# Patient Record
Sex: MALE | Race: BLACK OR AFRICAN AMERICAN | Employment: OTHER | URBAN - METROPOLITAN AREA
[De-identification: names, ages, dates, MRNs, and addresses within clinical notes are randomized per-mention and may not be internally consistent; named-entity substitution may affect disease eponyms.]

---

## 2021-06-17 ENCOUNTER — HOSPITAL ENCOUNTER (OUTPATIENT)
Age: 56
Setting detail: OBSERVATION
Discharge: HOME | End: 2021-06-18
Attending: EMERGENCY MEDICINE | Admitting: GENERAL ACUTE CARE HOSPITAL
Payer: OTHER GOVERNMENT

## 2021-06-17 ENCOUNTER — HOSPITAL ENCOUNTER (EMERGENCY)
Age: 56
Discharge: ANOTHER HOSPITAL | End: 2021-06-17
Attending: EMERGENCY MEDICINE

## 2021-06-17 ENCOUNTER — APPOINTMENT (OUTPATIENT)
Dept: CT IMAGING | Age: 56
End: 2021-06-17
Attending: EMERGENCY MEDICINE
Payer: OTHER GOVERNMENT

## 2021-06-17 ENCOUNTER — APPOINTMENT (OUTPATIENT)
Dept: GENERAL RADIOLOGY | Age: 56
End: 2021-06-17
Attending: EMERGENCY MEDICINE

## 2021-06-17 VITALS
OXYGEN SATURATION: 100 % | TEMPERATURE: 98.8 F | WEIGHT: 286 LBS | HEART RATE: 74 BPM | DIASTOLIC BLOOD PRESSURE: 98 MMHG | BODY MASS INDEX: 40.04 KG/M2 | RESPIRATION RATE: 16 BRPM | HEIGHT: 71 IN | SYSTOLIC BLOOD PRESSURE: 178 MMHG

## 2021-06-17 DIAGNOSIS — E66.9 OBESITY WITH SERIOUS COMORBIDITY, UNSPECIFIED CLASSIFICATION, UNSPECIFIED OBESITY TYPE: ICD-10-CM

## 2021-06-17 DIAGNOSIS — R07.9 ACUTE CHEST PAIN: Primary | ICD-10-CM

## 2021-06-17 DIAGNOSIS — R07.9 CHEST PAIN, UNSPECIFIED TYPE: ICD-10-CM

## 2021-06-17 DIAGNOSIS — I10 HTN (HYPERTENSION), MALIGNANT: Primary | ICD-10-CM

## 2021-06-17 DIAGNOSIS — M54.6 ACUTE BILATERAL THORACIC BACK PAIN: ICD-10-CM

## 2021-06-17 PROBLEM — I16.0 HYPERTENSIVE URGENCY: Status: ACTIVE | Noted: 2021-06-17

## 2021-06-17 LAB
ALBUMIN SERPL-MCNC: 3.5 G/DL (ref 3.5–5)
ALBUMIN/GLOB SERPL: 0.8 {RATIO} (ref 1.1–2.2)
ALP SERPL-CCNC: 109 U/L (ref 45–117)
ALT SERPL-CCNC: 48 U/L (ref 12–78)
ANION GAP SERPL CALC-SCNC: 11 MMOL/L (ref 5–15)
AST SERPL-CCNC: 22 U/L (ref 15–37)
ATRIAL RATE: 77 BPM
BASOPHILS # BLD: 0.1 K/UL (ref 0–0.1)
BASOPHILS NFR BLD: 1 % (ref 0–1)
BILIRUB SERPL-MCNC: 0.2 MG/DL (ref 0.2–1)
BUN SERPL-MCNC: 16 MG/DL (ref 6–20)
BUN/CREAT SERPL: 13 (ref 12–20)
CALCIUM SERPL-MCNC: 10.1 MG/DL (ref 8.5–10.1)
CALCULATED P AXIS, ECG09: 37 DEGREES
CALCULATED R AXIS, ECG10: 29 DEGREES
CALCULATED T AXIS, ECG11: 105 DEGREES
CHLORIDE SERPL-SCNC: 103 MMOL/L (ref 97–108)
CO2 SERPL-SCNC: 26 MMOL/L (ref 21–32)
CREAT SERPL-MCNC: 1.27 MG/DL (ref 0.7–1.3)
D DIMER PPP FEU-MCNC: 1.02 MG/L FEU (ref 0–0.65)
DIAGNOSIS, 93000: NORMAL
DIFFERENTIAL METHOD BLD: ABNORMAL
EOSINOPHIL # BLD: 0.5 K/UL (ref 0–0.4)
EOSINOPHIL NFR BLD: 7 % (ref 0–7)
ERYTHROCYTE [DISTWIDTH] IN BLOOD BY AUTOMATED COUNT: 13.9 % (ref 11.5–14.5)
GLOBULIN SER CALC-MCNC: 4.2 G/DL (ref 2–4)
GLUCOSE SERPL-MCNC: 228 MG/DL (ref 65–100)
HCT VFR BLD AUTO: 34.8 % (ref 36.6–50.3)
HGB BLD-MCNC: 11 G/DL (ref 12.1–17)
IMM GRANULOCYTES # BLD AUTO: 0 K/UL (ref 0–0.04)
IMM GRANULOCYTES NFR BLD AUTO: 0 % (ref 0–0.5)
INR PPP: 1.1 (ref 0.9–1.1)
LYMPHOCYTES # BLD: 2.6 K/UL (ref 0.8–3.5)
LYMPHOCYTES NFR BLD: 34 % (ref 12–49)
MCH RBC QN AUTO: 26.6 PG (ref 26–34)
MCHC RBC AUTO-ENTMCNC: 31.6 G/DL (ref 30–36.5)
MCV RBC AUTO: 84.1 FL (ref 80–99)
MONOCYTES # BLD: 0.6 K/UL (ref 0–1)
MONOCYTES NFR BLD: 7 % (ref 5–13)
NEUTS SEG # BLD: 3.9 K/UL (ref 1.8–8)
NEUTS SEG NFR BLD: 51 % (ref 32–75)
NRBC # BLD: 0 K/UL (ref 0–0.01)
NRBC BLD-RTO: 0 PER 100 WBC
P-R INTERVAL, ECG05: 198 MS
PLATELET # BLD AUTO: 302 K/UL (ref 150–400)
PMV BLD AUTO: 11.3 FL (ref 8.9–12.9)
POTASSIUM SERPL-SCNC: 3.6 MMOL/L (ref 3.5–5.1)
PROT SERPL-MCNC: 7.7 G/DL (ref 6.4–8.2)
PROTHROMBIN TIME: 10.4 SEC (ref 9–11.1)
Q-T INTERVAL, ECG07: 392 MS
QRS DURATION, ECG06: 94 MS
QTC CALCULATION (BEZET), ECG08: 443 MS
RBC # BLD AUTO: 4.14 M/UL (ref 4.1–5.7)
SODIUM SERPL-SCNC: 140 MMOL/L (ref 136–145)
TROPONIN I SERPL-MCNC: <0.05 NG/ML
TROPONIN I SERPL-MCNC: <0.05 NG/ML
VENTRICULAR RATE, ECG03: 77 BPM
WBC # BLD AUTO: 7.7 K/UL (ref 4.1–11.1)

## 2021-06-17 PROCEDURE — 74011000636 HC RX REV CODE- 636: Performed by: EMERGENCY MEDICINE

## 2021-06-17 PROCEDURE — 74011250636 HC RX REV CODE- 250/636: Performed by: EMERGENCY MEDICINE

## 2021-06-17 PROCEDURE — 96376 TX/PRO/DX INJ SAME DRUG ADON: CPT

## 2021-06-17 PROCEDURE — 84484 ASSAY OF TROPONIN QUANT: CPT

## 2021-06-17 PROCEDURE — 85025 COMPLETE CBC W/AUTO DIFF WBC: CPT

## 2021-06-17 PROCEDURE — 99285 EMERGENCY DEPT VISIT HI MDM: CPT

## 2021-06-17 PROCEDURE — 74011000250 HC RX REV CODE- 250: Performed by: EMERGENCY MEDICINE

## 2021-06-17 PROCEDURE — C9113 INJ PANTOPRAZOLE SODIUM, VIA: HCPCS | Performed by: EMERGENCY MEDICINE

## 2021-06-17 PROCEDURE — 93005 ELECTROCARDIOGRAM TRACING: CPT

## 2021-06-17 PROCEDURE — 36415 COLL VENOUS BLD VENIPUNCTURE: CPT

## 2021-06-17 PROCEDURE — 85379 FIBRIN DEGRADATION QUANT: CPT

## 2021-06-17 PROCEDURE — 71045 X-RAY EXAM CHEST 1 VIEW: CPT

## 2021-06-17 PROCEDURE — 65660000000 HC RM CCU STEPDOWN

## 2021-06-17 PROCEDURE — 74011250637 HC RX REV CODE- 250/637: Performed by: EMERGENCY MEDICINE

## 2021-06-17 PROCEDURE — 96375 TX/PRO/DX INJ NEW DRUG ADDON: CPT

## 2021-06-17 PROCEDURE — 99218 HC RM OBSERVATION: CPT

## 2021-06-17 PROCEDURE — 94762 N-INVAS EAR/PLS OXIMTRY CONT: CPT

## 2021-06-17 PROCEDURE — 65270000029 HC RM PRIVATE

## 2021-06-17 PROCEDURE — 96374 THER/PROPH/DIAG INJ IV PUSH: CPT

## 2021-06-17 PROCEDURE — 71275 CT ANGIOGRAPHY CHEST: CPT

## 2021-06-17 PROCEDURE — 85610 PROTHROMBIN TIME: CPT

## 2021-06-17 PROCEDURE — 80053 COMPREHEN METABOLIC PANEL: CPT

## 2021-06-17 RX ORDER — MORPHINE SULFATE 4 MG/ML
4 INJECTION INTRAVENOUS
Status: DISCONTINUED | OUTPATIENT
Start: 2021-06-17 | End: 2021-06-17 | Stop reason: HOSPADM

## 2021-06-17 RX ORDER — KETOROLAC TROMETHAMINE 30 MG/ML
15 INJECTION, SOLUTION INTRAMUSCULAR; INTRAVENOUS
Status: COMPLETED | OUTPATIENT
Start: 2021-06-17 | End: 2021-06-17

## 2021-06-17 RX ORDER — SODIUM CHLORIDE 0.9 % (FLUSH) 0.9 %
5-10 SYRINGE (ML) INJECTION ONCE
Status: DISCONTINUED | OUTPATIENT
Start: 2021-06-17 | End: 2021-06-17 | Stop reason: HOSPADM

## 2021-06-17 RX ORDER — NITROGLYCERIN 0.4 MG/1
0.4 TABLET SUBLINGUAL AS NEEDED
Status: DISCONTINUED | OUTPATIENT
Start: 2021-06-17 | End: 2021-06-18

## 2021-06-17 RX ORDER — LABETALOL HYDROCHLORIDE 5 MG/ML
20 INJECTION, SOLUTION INTRAVENOUS
Status: COMPLETED | OUTPATIENT
Start: 2021-06-17 | End: 2021-06-17

## 2021-06-17 RX ORDER — ASPIRIN 325 MG
325 TABLET ORAL ONCE
Status: COMPLETED | OUTPATIENT
Start: 2021-06-17 | End: 2021-06-17

## 2021-06-17 RX ADMIN — KETOROLAC TROMETHAMINE 15 MG: 30 INJECTION, SOLUTION INTRAMUSCULAR at 16:16

## 2021-06-17 RX ADMIN — ALUMINUM HYDROXIDE AND MAGNESIUM HYDROXIDE 40 ML: 200; 200 SUSPENSION ORAL at 20:37

## 2021-06-17 RX ADMIN — ASPIRIN 325 MG ORAL TABLET 325 MG: 325 PILL ORAL at 14:13

## 2021-06-17 RX ADMIN — IOPAMIDOL 100 ML: 755 INJECTION, SOLUTION INTRAVENOUS at 20:58

## 2021-06-17 RX ADMIN — LABETALOL HYDROCHLORIDE 20 MG: 5 INJECTION INTRAVENOUS at 19:48

## 2021-06-17 RX ADMIN — NITROGLYCERIN 1 INCH: 20 OINTMENT TOPICAL at 15:06

## 2021-06-17 RX ADMIN — NITROGLYCERIN 0.4 MG: 0.4 TABLET, ORALLY DISINTEGRATING SUBLINGUAL at 19:48

## 2021-06-17 RX ADMIN — SODIUM CHLORIDE 40 MG: 9 INJECTION, SOLUTION INTRAMUSCULAR; INTRAVENOUS; SUBCUTANEOUS at 20:37

## 2021-06-17 RX ADMIN — KETOROLAC TROMETHAMINE 15 MG: 30 INJECTION, SOLUTION INTRAMUSCULAR at 14:15

## 2021-06-17 NOTE — ED NOTES
TRANSFER - OUT REPORT:    Verbal report given to [facility] for [facility] (name) on Kalyan Srinivasan  being transferred to ER [facility](unit) for routine progression of care       Report consisted of patients Situation, Background, Assessment and   Recommendations(SBAR). Information from the following report(s) SBAR, Kardex, ED Summary, MAR, Recent Results, Med Rec Status and Cardiac Rhythm NSR was reviewed with the receiving nurse. Lines:   Peripheral IV 06/17/21 Left Antecubital (Active)   Site Assessment Clean, dry, & intact 06/17/21 1406   Phlebitis Assessment 0 06/17/21 1406   Infiltration Assessment 0 06/17/21 1406   Dressing Status Clean, dry, & intact 06/17/21 1406   Dressing Type Transparent 06/17/21 1406   Alcohol Cap Used No 06/17/21 1406        Opportunity for questions and clarification was provided.       Patient transported with:   monitor

## 2021-06-17 NOTE — ED PROVIDER NOTES
EMERGENCY DEPARTMENT HISTORY AND PHYSICAL EXAM      Date: 6/17/2021  Patient Name: Jo Ann Canada    History of Presenting Illness     Chief Complaint   Patient presents with    Chest Pain       History Provided By: Patient    HPI:   The history is provided by the patient. Chest Pain (Angina)   This is a new problem. The current episode started yesterday. The problem has not changed since onset. The problem occurs constantly. The pain is associated with normal activity, movement and rest. The pain is present in the lateral region and left side. The pain is moderate. The quality of the pain is described as dull. The pain radiates to the upper back and left arm. The symptoms are aggravated by movement. Associated symptoms include back pain. Pertinent negatives include no abdominal pain, no cough, no diaphoresis, no dizziness, no exertional chest pressure, no fever, no headaches, no irregular heartbeat, no leg pain, no lower extremity edema, no malaise/fatigue, no nausea, no near-syncope, no palpitations, no shortness of breath, no vomiting and no weakness. He has tried nothing for the symptoms. The treatment provided no relief. Risk factors include diabetes mellitus, hypertension, family history, male gender and dyslipidemia. His past medical history is significant for DM and HTN. His past medical history does not include PE. Pertinent negatives include no cardiac catheterization and no stress echo. Jo Ann Canada, 48 y.o. male  presents to the ED with cc of chest and back pain. Patient reports his back pain started 4 days ago, reports pain is located across the upper back dull achy worse with movement. The back pain moved around to the left side of the chest yesterday evening. He denies any shortness of breath diaphoresis, denies any nausea vomiting. Pain is dull to achy. Last night he reports the pain also started radiating down the left arm.   He denies exertional chest pain shortness of breath prior to these episodes of pain starting. Denies any fever chills cough. He reports family history of heart disease in the mother and father in her 62s and he also has a history of diabetes high blood pressure high cholesterol. He denies any history of coronary disease, reports no prior cardiac testing. Patient does take a baby aspirin daily. There are no other complaints, changes, or physical findings at this time. PCP: Kat Raymond, Not On File, MD    No current facility-administered medications on file prior to encounter. Current Outpatient Medications on File Prior to Encounter   Medication Sig Dispense Refill    oxyCODONE-acetaminophen (PERCOCET) 5-325 mg per tablet Take 1 Tab by mouth every four (4) hours as needed for Pain. Max Daily Amount: 6 Tabs. 6 Tab 0    amLODIPine (NORVASC) 10 mg tablet Take  by mouth daily.  aspirin 81 mg chewable tablet Take 81 mg by mouth daily.  atorvastatin (LIPITOR) 80 mg tablet Take 80 mg by mouth daily.  docusate sodium (COLACE) 100 mg capsule Take 100 mg by mouth two (2) times a day.  hydroCHLOROthiazide (HYDRODIURIL) 25 mg tablet Take 25 mg by mouth daily.  lisinopril (PRINIVIL, ZESTRIL) 40 mg tablet Take 40 mg by mouth daily.  metoprolol tartrate (LOPRESSOR) 50 mg tablet Take 50 mg by mouth two (2) times a day.  minocycline (MINOCIN, DYNACIN) 100 mg capsule Take 100 mg by mouth.  omeprazole (PRILOSEC) 20 mg capsule Take 20 mg by mouth daily.  prazosin (MINIPRESS) 2 mg capsule Take 2 mg by mouth daily.  sennosides 8.6 mg cap Take 1 Tab by mouth.  venlafaxine (EFFEXOR) 75 mg tablet Take 75 mg by mouth three (3) times daily.  [DISCONTINUED] metFORMIN (GLUCOPHAGE) 1,000 mg tablet Take 1,000 mg by mouth two (2) times a day. Past History     Past Medical History:  Past Medical History:   Diagnosis Date    Diabetes ([address]     Hyperlipidemia     Hypertension        Past Surgical History:  History reviewed.  No pertinent surgical history. Family History:  History reviewed. No pertinent family history. Social History:  Social History     Tobacco Use    Smoking status: Never Smoker    Smokeless tobacco: Never Used   Substance Use Topics    Alcohol use: No    Drug use: Never       Allergies: Allergies   Allergen Reactions    Iodine Hives         Review of Systems   Review of Systems   Constitutional: Negative. Negative for chills, diaphoresis, fever and malaise/fatigue. HENT: Negative. Negative for congestion and sore throat. Eyes: Negative. Negative for discharge and redness. Respiratory: Negative. Negative for cough and shortness of breath. Cardiovascular: Positive for chest pain. Negative for palpitations and near-syncope. Gastrointestinal: Negative. Negative for abdominal pain, nausea and vomiting. Endocrine: Negative. Negative for polydipsia and polyuria. Genitourinary: Negative. Negative for dysuria, flank pain and frequency. Musculoskeletal: Positive for back pain. Negative for arthralgias and neck pain. Skin: Negative. Negative for rash and wound. Allergic/Immunologic: Negative. Neurological: Negative. Negative for dizziness, weakness and headaches. Hematological: Negative. Negative for adenopathy. Does not bruise/bleed easily. Psychiatric/Behavioral: Negative. Negative for confusion. The patient is not nervous/anxious. All other systems reviewed and are negative. Physical Exam   Physical Exam  Vitals and nursing note reviewed. Constitutional:       General: He is not in acute distress. Appearance: Normal appearance. He is well-developed. He is not ill-appearing, toxic-appearing or diaphoretic. HENT:      Head: Normocephalic and atraumatic. Nose: Nose normal.      Mouth/Throat:      Mouth: Mucous membranes are moist.      Pharynx: Oropharynx is clear. Eyes:      Extraocular Movements: Extraocular movements intact.       Conjunctiva/sclera: Conjunctivae normal.      Pupils: Pupils are equal, round, and reactive to light. Cardiovascular:      Rate and Rhythm: Normal rate and regular rhythm. Pulses: Normal pulses. Heart sounds: Normal heart sounds. No murmur heard. Pulmonary:      Effort: Pulmonary effort is normal. No respiratory distress. Breath sounds: Normal breath sounds. No wheezing, rhonchi or rales. Abdominal:      General: There is no distension. Palpations: Abdomen is soft. Tenderness: There is no abdominal tenderness. Musculoskeletal:         General: No swelling or tenderness. Normal range of motion. Cervical back: Normal range of motion and neck supple. No rigidity. No muscular tenderness. Right lower leg: Edema present. Left lower leg: Edema present. Comments: trace bilateral lower extremity edema   Skin:     General: Skin is warm and dry. Capillary Refill: Capillary refill takes less than 2 seconds. Findings: No erythema or rash. Neurological:      General: No focal deficit present. Mental Status: He is alert and oriented to person, place, and time. Mental status is at baseline. Cranial Nerves: No cranial nerve deficit. Sensory: No sensory deficit. Motor: No weakness. Psychiatric:         Mood and Affect: Mood normal.         Behavior: Behavior normal.         Thought Content:  Thought content normal.         Judgment: Judgment normal.         Diagnostic Study Results     Labs -     Recent Results (from the past 12 hour(s))   EKG, 12 LEAD, INITIAL    Collection Time: 06/17/21  1:49 PM   Result Value Ref Range    Ventricular Rate 77 BPM    Atrial Rate 77 BPM    P-R Interval 198 ms    QRS Duration 94 ms    Q-T Interval 392 ms    QTC Calculation (Bezet) 443 ms    Calculated P Axis 37 degrees    Calculated R Axis 29 degrees    Calculated T Axis 105 degrees    Diagnosis       Normal sinus rhythm  Nonspecific T wave abnormality  Abnormal ECG  No previous ECGs available CBC WITH AUTOMATED DIFF    Collection Time: 06/17/21  2:00 PM   Result Value Ref Range    WBC 7.7 4.1 - 11.1 K/uL    RBC 4.14 4.10 - 5.70 M/uL    HGB 11.0 (L) 12.1 - 17.0 g/dL    HCT 34.8 (L) 36.6 - 50.3 %    MCV 84.1 80.0 - 99.0 FL    MCH 26.6 26.0 - 34.0 PG    MCHC 31.6 30.0 - 36.5 g/dL    RDW 13.9 11.5 - 14.5 %    PLATELET 409 104 - 209 K/uL    MPV 11.3 8.9 - 12.9 FL    NRBC 0.0 0  WBC    ABSOLUTE NRBC 0.00 0.00 - 0.01 K/uL    NEUTROPHILS 51 32 - 75 %    LYMPHOCYTES 34 12 - 49 %    MONOCYTES 7 5 - 13 %    EOSINOPHILS 7 0 - 7 %    BASOPHILS 1 0 - 1 %    IMMATURE GRANULOCYTES 0 0.0 - 0.5 %    ABS. NEUTROPHILS 3.9 1.8 - 8.0 K/UL    ABS. LYMPHOCYTES 2.6 0.8 - 3.5 K/UL    ABS. MONOCYTES 0.6 0.0 - 1.0 K/UL    ABS. EOSINOPHILS 0.5 (H) 0.0 - 0.4 K/UL    ABS. BASOPHILS 0.1 0.0 - 0.1 K/UL    ABS. IMM. GRANS. 0.0 0.00 - 0.04 K/UL    DF AUTOMATED     METABOLIC PANEL, COMPREHENSIVE    Collection Time: 06/17/21  2:00 PM   Result Value Ref Range    Sodium 140 136 - 145 mmol/L    Potassium 3.6 3.5 - 5.1 mmol/L    Chloride 103 97 - 108 mmol/L    CO2 26 21 - 32 mmol/L    Anion gap 11 5 - 15 mmol/L    Glucose 228 (H) 65 - 100 mg/dL    BUN 16 6 - 20 MG/DL    Creatinine 1.27 0.70 - 1.30 MG/DL    BUN/Creatinine ratio 13 12 - 20      GFR est AA >60 >60 ml/min/1.73m2    GFR est non-AA 59 (L) >60 ml/min/1.73m2    Calcium 10.1 8.5 - 10.1 MG/DL    Bilirubin, total 0.2 0.2 - 1.0 MG/DL    ALT (SGPT) 48 12 - 78 U/L    AST (SGOT) 22 15 - 37 U/L    Alk.  phosphatase 109 45 - 117 U/L    Protein, total 7.7 6.4 - 8.2 g/dL    Albumin 3.5 3.5 - 5.0 g/dL    Globulin 4.2 (H) 2.0 - 4.0 g/dL    A-G Ratio 0.8 (L) 1.1 - 2.2     TROPONIN I    Collection Time: 06/17/21  2:00 PM   Result Value Ref Range    Troponin-I, Qt. <0.05 <0.05 ng/mL   PROTHROMBIN TIME + INR    Collection Time: 06/17/21  2:00 PM   Result Value Ref Range    INR 1.1 0.9 - 1.1      Prothrombin time 10.4 9.0 - 11.1 sec   D DIMER    Collection Time: 06/17/21  2:00 PM   Result Value Ref Range    D-dimer 1.02 (H) 0.00 - 0.65 mg/L FEU       Radiologic Studies -   XR CHEST PORT   Final Result      No acute process on portable chest.           CT Results  (Last 48 hours)    None        CXR Results  (Last 48 hours)               06/17/21 1356  XR CHEST PORT Final result    Impression:      No acute process on portable chest.           Narrative:  EXAM:  XR CHEST PORT       INDICATION: Chest pain for 4 days with associated fatigue, intermittent   extension into the left upper extremity. Chronic pain medications. COMPARISON: none       TECHNIQUE: Upright portable chest AP view       FINDINGS: Cardiac monitoring wires overlie the thorax. The cardiomediastinal and   hilar contours are within normal limits. The pulmonary vasculature is within   normal limits. The lungs and pleural spaces are clear. The visualized bones and upper abdomen   are age-appropriate. Medical Decision Making   I am the first provider for this patient. I reviewed the vital signs, available nursing notes, past medical history, past surgical history, family history and social history. Vital Signs-Reviewed the patient's vital signs. Patient Vitals for the past 12 hrs:   Temp Pulse Resp BP SpO2   06/17/21 1413  78 16 134/89 99 %   06/17/21 1404 99.3 °F (37.4 °C) 89 20 (!) 158/96 98 %           EKG interpretation: (Preliminary)  Rhythm: normal sinus rhythm;  regular . Rate (approx.): 77; Blocks: none; Ectopy: noneAxis: normal; P wave: normal; QRS interval: normal ; ST/T wave: non-specific changes; in  Lead: ; Other findings: .        Records Reviewed: Nursing Notes, Old Medical Records and Previous electrocardiograms    Provider Notes (Medical Decision Making):   Patient presents complaining of back and chest pain, will check EKG laboratory studies chest x-ray D-dimer. Patient has multiple risk factors for coronary disease. ED Course:   Initial assessment performed.  The patients presenting problems have been discussed, and they are in agreement with the care plan formulated and outlined with them. I have encouraged them to ask questions as they arise throughout their visit. ED Course as of Jun 17 1507   Thu Jun 17, 2021   1452 Updated patient and family laboratory studies and plan for admission. Patient requesting to go to the [facility]. Patient does report improvement with pain meds    [MF]   1459 Patient agreeable to be transferred to [facility] since [place] is not excepting patients    [MF]   1502 Transfer center contacted regarding transfer ED [facility], no inpt beds will transfer to ED    [MF]   1503 Dr Leonora Chavez accepts to ED ED [facility]    []      ED Course User Index  [MF] Ady Bond MD     Patient's heart score is 5      3:01 PM    Patient presents complaining of chest and back pain, symptoms are prolonged and has atypical component. EKG has some nonspecific changes he has multiple risk factors for coronary disease with no coronary history. First troponin was negative, recommend admission for further evaluation serial enzymes and cardiac consultation. Patient is agreeable to be admitted to [facility].  Patient's D-dimer was elevated however patient is allergic to iodine unable to perform CTA here. I have reviewed all pertinent and currently available diagnostic test results for this visit including, but not limited to, labs, xrays, and EKGs. I have reviewed all pertinent and currently available medical records. My plan of care and further evaluation and/or disposition is based on these results, as well as the initial, and subsequent, history and physical exam, as well as any additional complaints during the visit.     Ty Hernandez MD        Procedures      Critical Care Time:   CRITICAL CARE NOTE :    3:01 PM      IMPENDING DETERIORATION -Cardiovascular  ASSOCIATED RISK FACTORS - Dysrhythmia  MANAGEMENT- Bedside Assessment, Supervision of Care and Transfer  INTERPRETATION -  Xrays, ECG and Blood Pressure  INTERVENTIONS - Metobolic interventions  CASE REVIEW - Hospitalist/Intensivist and Family  TREATMENT RESPONSE -Improved and Stable  PERFORMED BY - Self    NOTES   :  I have spent 32 minutes of critical care time involved in lab review, consultations with specialist, family decision- making, bedside attention and documentation. This time excludes time spent in any separate billed procedures. During this entire length of time I was immediately available to the patient . Masoud Reddy MD      Disposition:    Transferred to Another Facility        Diagnosis     Clinical Impression:   1. Acute chest pain    2. Acute bilateral thoracic back pain        Attestations: Masoud Reddy MD    Please note that this dictation was completed with [employer], the computer voice recognition software. Quite often unanticipated grammatical, syntax, homophones, and other interpretive errors are inadvertently transcribed by the computer software. Please disregard these errors. Please excuse any errors that have escaped final proofreading. Thank you.

## 2021-06-17 NOTE — Clinical Note
Status[de-identified] INPATIENT [101]   Type of Bed: Stepdown [17]   Cardiac Monitoring Required?: Yes   Inpatient Hospitalization Certified Necessary for the Following Reasons: 3.  Patient receiving treatment that can only be provided in an inpatient setting (further clarification in H&P documentation)   Admitting Diagnosis: Hypertensive urgency [[id number]]   Admitting Physician: Eduard Garcias [[id number]]   Attending Physician: Darci Camarillo   Estimated Length of Stay: 2 Midnights   Discharge Plan[de-identified] Home with Office Follow-up

## 2021-06-17 NOTE — ED TRIAGE NOTES
Assumed care of pt from triage. Pt is A&O x 4. Pt is a transfer from [facility]. Pt states that Sunday he began to have lower back pain that radiated down his left leg. Pt says yesterday the pain went from his back to his chest. Pt rates CP at this time 6 out of 10. Pt at [facility] was given 352 of ASA, 1 inch of nitro, and 15 mg of Toradol. Pt troponin was negative but d-dimer was elevated. Pt resting on stretcher in POC with call bell in reach. Pt placed on monitor x 3. VSS at this time. 1937: Dr. Wilder Segal at bedside evaluating pt.     1955: Medicated pt per orders. 2043: Medicated pt per orders. Pt off the floor to CT.     2134: Pt provided with food and drink at this time. 2309: Pt notified this RN that he normally sleeps with a CPAP machine at night. Pt placed on 2 L NC at this time so pt can sleep. Pt resting on stretcher in POC with call bell in reach. Pt remains on monitor x 3. VSS at this time. 2329: Bedside and Verbal shift change report given to [facility] (oncoming nurse) by Hernandez Serrato (offgoing nurse). Report included the following information SBAR, ED Summary and Recent Results.

## 2021-06-17 NOTE — ED TRIAGE NOTES
Pain started in his back Sunday. Sharp pain. Moved to his chest Wednesday. Pain never went away. Patient takes oxycodone for chronic pain. NO nausea or vomiting or SOB or feeling sweaty. More fatigued. At night the pain goes into his left arm.

## 2021-06-17 NOTE — ED NOTES
Called [facility] and spoke with Lian to arrange an ACLS transport. Went over diagnosis and equipment needed (IV, CM.)  ETA of 1600 given.

## 2021-06-17 NOTE — ED NOTES
Unable to get through to the charge phone number line not connecting. Called Transfer center to connect.

## 2021-06-17 NOTE — ED NOTES
TRANSFER - OUT REPORT:    Verbal report given to [address] [place] [address] at [facility](name) on Daryle Rhea  being transferred to [facility](unit) for routine progression of care       Report consisted of patients Situation, Background, Assessment and   Recommendations(SBAR). Information from the following report(s) SBAR, Kardex, ED Summary, Intake/Output, MAR, Recent Results, Med Rec Status and Cardiac Rhythm NSR was reviewed with the receiving nurse. Lines:   Peripheral IV 06/17/21 Left Antecubital (Active)   Site Assessment Clean, dry, & intact 06/17/21 1406   Phlebitis Assessment 0 06/17/21 1406   Infiltration Assessment 0 06/17/21 1406   Dressing Status Clean, dry, & intact 06/17/21 1406   Dressing Type Transparent 06/17/21 1406   Alcohol Cap Used No 06/17/21 1406        Opportunity for questions and clarification was provided.       Patient transported with:   Awaiting transport

## 2021-06-18 ENCOUNTER — APPOINTMENT (OUTPATIENT)
Dept: CT IMAGING | Age: 56
End: 2021-06-18
Attending: GENERAL ACUTE CARE HOSPITAL
Payer: OTHER GOVERNMENT

## 2021-06-18 VITALS
HEIGHT: 71 IN | BODY MASS INDEX: 40.04 KG/M2 | DIASTOLIC BLOOD PRESSURE: 86 MMHG | SYSTOLIC BLOOD PRESSURE: 140 MMHG | TEMPERATURE: 98 F | RESPIRATION RATE: 18 BRPM | HEART RATE: 73 BPM | WEIGHT: 286 LBS | OXYGEN SATURATION: 98 %

## 2021-06-18 PROBLEM — M54.9 BACK PAIN: Status: ACTIVE | Noted: 2021-06-18

## 2021-06-18 LAB
ANION GAP SERPL CALC-SCNC: 5 MMOL/L (ref 5–15)
ATRIAL RATE: 74 BPM
BUN SERPL-MCNC: 18 MG/DL (ref 6–20)
BUN/CREAT SERPL: 14 (ref 12–20)
CALCIUM SERPL-MCNC: 9.4 MG/DL (ref 8.5–10.1)
CALCULATED P AXIS, ECG09: 34 DEGREES
CALCULATED R AXIS, ECG10: 25 DEGREES
CALCULATED T AXIS, ECG11: 130 DEGREES
CHLORIDE SERPL-SCNC: 108 MMOL/L (ref 97–108)
CO2 SERPL-SCNC: 25 MMOL/L (ref 21–32)
CREAT SERPL-MCNC: 1.25 MG/DL (ref 0.7–1.3)
DIAGNOSIS, 93000: NORMAL
GLUCOSE BLD STRIP.AUTO-MCNC: 225 MG/DL (ref 65–117)
GLUCOSE BLD STRIP.AUTO-MCNC: 258 MG/DL (ref 65–117)
GLUCOSE SERPL-MCNC: 246 MG/DL (ref 65–100)
P-R INTERVAL, ECG05: 200 MS
POTASSIUM SERPL-SCNC: 3.7 MMOL/L (ref 3.5–5.1)
Q-T INTERVAL, ECG07: 370 MS
QRS DURATION, ECG06: 84 MS
QTC CALCULATION (BEZET), ECG08: 410 MS
SERVICE CMNT-IMP: ABNORMAL
SERVICE CMNT-IMP: ABNORMAL
SODIUM SERPL-SCNC: 138 MMOL/L (ref 136–145)
TROPONIN I SERPL-MCNC: <0.05 NG/ML
VENTRICULAR RATE, ECG03: 74 BPM

## 2021-06-18 PROCEDURE — 36415 COLL VENOUS BLD VENIPUNCTURE: CPT

## 2021-06-18 PROCEDURE — 74011250637 HC RX REV CODE- 250/637: Performed by: GENERAL ACUTE CARE HOSPITAL

## 2021-06-18 PROCEDURE — 80048 BASIC METABOLIC PNL TOTAL CA: CPT

## 2021-06-18 PROCEDURE — 74011000250 HC RX REV CODE- 250: Performed by: GENERAL ACUTE CARE HOSPITAL

## 2021-06-18 PROCEDURE — 74011250636 HC RX REV CODE- 250/636: Performed by: GENERAL ACUTE CARE HOSPITAL

## 2021-06-18 PROCEDURE — 72128 CT CHEST SPINE W/O DYE: CPT

## 2021-06-18 PROCEDURE — 99218 HC RM OBSERVATION: CPT

## 2021-06-18 PROCEDURE — 74011636637 HC RX REV CODE- 636/637: Performed by: NURSE PRACTITIONER

## 2021-06-18 PROCEDURE — 96372 THER/PROPH/DIAG INJ SC/IM: CPT

## 2021-06-18 PROCEDURE — 82962 GLUCOSE BLOOD TEST: CPT

## 2021-06-18 PROCEDURE — 84484 ASSAY OF TROPONIN QUANT: CPT

## 2021-06-18 RX ORDER — HYDROMORPHONE HYDROCHLORIDE 1 MG/ML
1 INJECTION, SOLUTION INTRAMUSCULAR; INTRAVENOUS; SUBCUTANEOUS
Status: DISCONTINUED | OUTPATIENT
Start: 2021-06-18 | End: 2021-06-18 | Stop reason: HOSPADM

## 2021-06-18 RX ORDER — LIDOCAINE 4 G/100G
1 PATCH TOPICAL EVERY 24 HOURS
Status: DISCONTINUED | OUTPATIENT
Start: 2021-06-18 | End: 2021-06-18 | Stop reason: HOSPADM

## 2021-06-18 RX ORDER — ATORVASTATIN CALCIUM 40 MG/1
80 TABLET, FILM COATED ORAL DAILY
Status: DISCONTINUED | OUTPATIENT
Start: 2021-06-18 | End: 2021-06-18 | Stop reason: HOSPADM

## 2021-06-18 RX ORDER — PANTOPRAZOLE SODIUM 40 MG/1
40 TABLET, DELAYED RELEASE ORAL
Status: DISCONTINUED | OUTPATIENT
Start: 2021-06-18 | End: 2021-06-18 | Stop reason: HOSPADM

## 2021-06-18 RX ORDER — DEXTROSE 50 % IN WATER (D50W) INTRAVENOUS SYRINGE
12.5-25 AS NEEDED
Status: DISCONTINUED | OUTPATIENT
Start: 2021-06-18 | End: 2021-06-18 | Stop reason: HOSPADM

## 2021-06-18 RX ORDER — ACETAMINOPHEN 650 MG/1
650 SUPPOSITORY RECTAL
Status: DISCONTINUED | OUTPATIENT
Start: 2021-06-18 | End: 2021-06-18 | Stop reason: HOSPADM

## 2021-06-18 RX ORDER — HYDROCHLOROTHIAZIDE 25 MG/1
25 TABLET ORAL DAILY
Status: DISCONTINUED | OUTPATIENT
Start: 2021-06-18 | End: 2021-06-18 | Stop reason: HOSPADM

## 2021-06-18 RX ORDER — SODIUM CHLORIDE 0.9 % (FLUSH) 0.9 %
5-40 SYRINGE (ML) INJECTION AS NEEDED
Status: DISCONTINUED | OUTPATIENT
Start: 2021-06-18 | End: 2021-06-18 | Stop reason: HOSPADM

## 2021-06-18 RX ORDER — LISINOPRIL 20 MG/1
40 TABLET ORAL DAILY
Status: DISCONTINUED | OUTPATIENT
Start: 2021-06-18 | End: 2021-06-18 | Stop reason: HOSPADM

## 2021-06-18 RX ORDER — ENOXAPARIN SODIUM 100 MG/ML
40 INJECTION SUBCUTANEOUS DAILY
Status: DISCONTINUED | OUTPATIENT
Start: 2021-06-18 | End: 2021-06-18 | Stop reason: HOSPADM

## 2021-06-18 RX ORDER — MAGNESIUM SULFATE 100 %
4 CRYSTALS MISCELLANEOUS AS NEEDED
Status: DISCONTINUED | OUTPATIENT
Start: 2021-06-18 | End: 2021-06-18 | Stop reason: HOSPADM

## 2021-06-18 RX ORDER — OXYCODONE AND ACETAMINOPHEN 5; 325 MG/1; MG/1
2 TABLET ORAL
Status: DISCONTINUED | OUTPATIENT
Start: 2021-06-18 | End: 2021-06-18 | Stop reason: HOSPADM

## 2021-06-18 RX ORDER — SODIUM CHLORIDE 9 MG/ML
50 INJECTION, SOLUTION INTRAVENOUS CONTINUOUS
Status: DISCONTINUED | OUTPATIENT
Start: 2021-06-18 | End: 2021-06-18 | Stop reason: HOSPADM

## 2021-06-18 RX ORDER — ONDANSETRON 2 MG/ML
4 INJECTION INTRAMUSCULAR; INTRAVENOUS
Status: DISCONTINUED | OUTPATIENT
Start: 2021-06-18 | End: 2021-06-18 | Stop reason: HOSPADM

## 2021-06-18 RX ORDER — SODIUM CHLORIDE 0.9 % (FLUSH) 0.9 %
5-40 SYRINGE (ML) INJECTION EVERY 8 HOURS
Status: DISCONTINUED | OUTPATIENT
Start: 2021-06-18 | End: 2021-06-18 | Stop reason: HOSPADM

## 2021-06-18 RX ORDER — ACETAMINOPHEN 325 MG/1
650 TABLET ORAL
Status: DISCONTINUED | OUTPATIENT
Start: 2021-06-18 | End: 2021-06-18 | Stop reason: HOSPADM

## 2021-06-18 RX ORDER — KETOROLAC TROMETHAMINE 30 MG/ML
30 INJECTION, SOLUTION INTRAMUSCULAR; INTRAVENOUS
Status: DISCONTINUED | OUTPATIENT
Start: 2021-06-18 | End: 2021-06-18 | Stop reason: HOSPADM

## 2021-06-18 RX ORDER — ONDANSETRON 4 MG/1
4 TABLET, ORALLY DISINTEGRATING ORAL
Status: DISCONTINUED | OUTPATIENT
Start: 2021-06-18 | End: 2021-06-18 | Stop reason: HOSPADM

## 2021-06-18 RX ORDER — SENNOSIDES 8.6 MG/1
2 TABLET ORAL DAILY
Status: DISCONTINUED | OUTPATIENT
Start: 2021-06-18 | End: 2021-06-18 | Stop reason: HOSPADM

## 2021-06-18 RX ORDER — METOPROLOL TARTRATE 50 MG/1
50 TABLET ORAL 2 TIMES DAILY
Status: DISCONTINUED | OUTPATIENT
Start: 2021-06-18 | End: 2021-06-18 | Stop reason: HOSPADM

## 2021-06-18 RX ORDER — POLYETHYLENE GLYCOL 3350 17 G/17G
17 POWDER, FOR SOLUTION ORAL DAILY PRN
Status: DISCONTINUED | OUTPATIENT
Start: 2021-06-18 | End: 2021-06-18 | Stop reason: HOSPADM

## 2021-06-18 RX ORDER — HYDRALAZINE HYDROCHLORIDE 20 MG/ML
20 INJECTION INTRAMUSCULAR; INTRAVENOUS
Status: DISCONTINUED | OUTPATIENT
Start: 2021-06-18 | End: 2021-06-18 | Stop reason: HOSPADM

## 2021-06-18 RX ORDER — INSULIN LISPRO 100 [IU]/ML
INJECTION, SOLUTION INTRAVENOUS; SUBCUTANEOUS
Status: DISCONTINUED | OUTPATIENT
Start: 2021-06-18 | End: 2021-06-18 | Stop reason: HOSPADM

## 2021-06-18 RX ORDER — GUAIFENESIN 100 MG/5ML
81 LIQUID (ML) ORAL DAILY
Status: DISCONTINUED | OUTPATIENT
Start: 2021-06-18 | End: 2021-06-18 | Stop reason: HOSPADM

## 2021-06-18 RX ORDER — VENLAFAXINE 37.5 MG/1
75 TABLET ORAL 3 TIMES DAILY
Status: DISCONTINUED | OUTPATIENT
Start: 2021-06-18 | End: 2021-06-18 | Stop reason: HOSPADM

## 2021-06-18 RX ORDER — HYDROCHLOROTHIAZIDE 25 MG/1
50 TABLET ORAL DAILY
Qty: 30 TABLET | Refills: 1 | Status: SHIPPED | OUTPATIENT
Start: 2021-06-18

## 2021-06-18 RX ORDER — AMLODIPINE BESYLATE 5 MG/1
10 TABLET ORAL DAILY
Status: DISCONTINUED | OUTPATIENT
Start: 2021-06-18 | End: 2021-06-18 | Stop reason: HOSPADM

## 2021-06-18 RX ORDER — PRAZOSIN HYDROCHLORIDE 1 MG/1
2 CAPSULE ORAL DAILY
Status: DISCONTINUED | OUTPATIENT
Start: 2021-06-18 | End: 2021-06-18 | Stop reason: HOSPADM

## 2021-06-18 RX ADMIN — INSULIN LISPRO 5 UNITS: 100 INJECTION, SOLUTION INTRAVENOUS; SUBCUTANEOUS at 11:55

## 2021-06-18 RX ADMIN — OXYCODONE HYDROCHLORIDE AND ACETAMINOPHEN 2 TABLET: 5; 325 TABLET ORAL at 08:18

## 2021-06-18 RX ADMIN — ASPIRIN 81 MG CHEWABLE TABLET 81 MG: 81 TABLET CHEWABLE at 08:19

## 2021-06-18 RX ADMIN — METOPROLOL TARTRATE 50 MG: 50 TABLET, FILM COATED ORAL at 08:19

## 2021-06-18 RX ADMIN — OXYCODONE HYDROCHLORIDE AND ACETAMINOPHEN 2 TABLET: 5; 325 TABLET ORAL at 03:03

## 2021-06-18 RX ADMIN — PANTOPRAZOLE SODIUM 40 MG: 40 TABLET, DELAYED RELEASE ORAL at 08:19

## 2021-06-18 RX ADMIN — PRAZOSIN HYDROCHLORIDE 2 MG: 1 CAPSULE ORAL at 08:20

## 2021-06-18 RX ADMIN — INSULIN LISPRO 3 UNITS: 100 INJECTION, SOLUTION INTRAVENOUS; SUBCUTANEOUS at 08:20

## 2021-06-18 RX ADMIN — Medication 10 ML: at 03:07

## 2021-06-18 RX ADMIN — VENLAFAXINE 75 MG: 37.5 TABLET ORAL at 08:20

## 2021-06-18 RX ADMIN — ATORVASTATIN CALCIUM 80 MG: 40 TABLET, FILM COATED ORAL at 08:19

## 2021-06-18 RX ADMIN — SODIUM CHLORIDE 50 ML/HR: 9 INJECTION, SOLUTION INTRAVENOUS at 01:44

## 2021-06-18 RX ADMIN — AMLODIPINE BESYLATE 10 MG: 5 TABLET ORAL at 08:19

## 2021-06-18 RX ADMIN — LISINOPRIL 40 MG: 20 TABLET ORAL at 08:18

## 2021-06-18 RX ADMIN — ENOXAPARIN SODIUM 40 MG: 40 INJECTION SUBCUTANEOUS at 08:20

## 2021-06-18 RX ADMIN — HYDROCHLOROTHIAZIDE 25 MG: 25 TABLET ORAL at 08:19

## 2021-06-18 RX ADMIN — SENNOSIDES 17.2 MG: 8.6 TABLET, FILM COATED ORAL at 08:19

## 2021-06-18 NOTE — PROGRESS NOTES
Transition of Care Plan:    RUR: 9%  Disposition: obs   Follow up appointments: Follow up with the [facility]  DME needed: N/A  Transportation at Discharge: Family to transport home   Nolene Arenas or means to access home:  Family have keys to enter home       IM Medicare letter: obs letter   Caregiver Contact: Guera Sanchez (spouse) [phone]  Discharge Caregiver contacted prior to discharge? Family to be contacted      CM completed room visit with pt, to provide him with observation letter (signed) placed in chart. Pt reported that he has a CPAP machine at home, that he wears nightly. Pt reported that he will have transport home on today, provided by his sister. Pt resides 2 hours away, and pt sister plans to be here by 1PM.  Pt plans to follow up with [facility] for follow up appointment. CM completed the need of the pt at this time.     CHRIS Samuel, [address] [place] [address]

## 2021-06-18 NOTE — ED PROVIDER NOTES
EMERGENCY DEPARTMENT HISTORY AND PHYSICAL EXAM    Please note that this dictation was completed with [employer], the computer voice recognition software. Quite often unanticipated grammatical, syntax, homophones, and other interpretive errors are inadvertently transcribed by the computer software. Please disregard these errors. Please excuse any errors that have escaped final proofreading. Date: 6/17/2021  Patient Name: Dell Robles  Patient Age and Sex: 48 y.o. male    History of Presenting Illness     Chief Complaint   Patient presents with    Transfer Of Care       History Provided By: Patient    HPI: Dell Robles, is a 48 y.o. male whose medical history is noted below presents to the emergency department as transfer from an outside hospital for acute onset of chest pain and low back pain. HPI from transferring provider, Dr. Enrique Wodos, is as follows:    Benita Andrews, 48 y.o. male  presents to the ED with cc of chest and back pain. Patient reports his back pain started 4 days ago, reports pain is located across the upper back dull achy worse with movement. The back pain moved around to the left side of the chest yesterday evening. He denies any shortness of breath diaphoresis, denies any nausea vomiting. Pain is dull to achy. Last night he reports the pain also started radiating down the left arm. He denies exertional chest pain shortness of breath prior to these episodes of pain starting. Denies any fever chills cough. He reports family history of heart disease in the mother and father in her 62s and he also has a history of diabetes high blood pressure high cholesterol. He denies any history of coronary disease, reports no prior cardiac testing. Patient does take a baby aspirin daily. \"    On arrival to our emergency department the patient is significantly hypertensive, continues to have the chest pain described here above and states that it has not changed at all since he was seen at the outside hospital.  Pain is nonpleuritic. He denies having any shortness of breath. Received sublingual nitro, full aspirin prior to transfer. He states that he took all of his blood pressure medications today as prescribed. Pt denies any other alleviating or exacerbating factors. No other associated signs or symptoms. There are no other complaints, changes or physical findings at this time. PCP: None    Past History   All documented elements of the [facility] reviewed and verified by me. -Debby Castañeda MD    Past Medical History:  Past Medical History:   Diagnosis Date    Diabetes ([facility] [address]     Hyperlipidemia     Hypertension        Past Surgical History:  No past surgical history on file. Family History:  History reviewed. No pertinent family history. Social History:  Social History     Tobacco Use    Smoking status: Never Smoker    Smokeless tobacco: Never Used   Substance Use Topics    Alcohol use: No    Drug use: Never       Allergies: Allergies   Allergen Reactions    Iodine Hives    Morphine Other (comments)     hallucinations       Review of Systems   All other systems reviewed and negative    Review of Systems   Constitutional: Negative for appetite change, chills and fever. HENT: Negative. Eyes: Negative. Negative for photophobia and visual disturbance. Respiratory: Negative for cough, shortness of breath and wheezing. Cardiovascular: Positive for chest pain. Negative for palpitations. Gastrointestinal: Negative for abdominal pain, constipation, diarrhea, nausea and vomiting. Endocrine: Negative. Genitourinary: Negative for dysuria, flank pain and hematuria. Musculoskeletal: Positive for back pain. Negative for joint swelling. Skin: Negative. Negative for color change and rash. Neurological: Negative for dizziness, weakness, light-headedness, numbness and headaches. Hematological: Negative for adenopathy. Does not bruise/bleed easily.    All other systems reviewed and are negative. Physical Exam   Reviewed patients vital signs and nursing note    Physical Exam  Vitals and nursing note reviewed. Constitutional:       Appearance: He is obese. HENT:      Head: Atraumatic. Mouth/Throat:      Mouth: Mucous membranes are moist.   Eyes:      General: No scleral icterus. Extraocular Movements: Extraocular movements intact. Conjunctiva/sclera: Conjunctivae normal.      Pupils: Pupils are equal, round, and reactive to light. Cardiovascular:      Rate and Rhythm: Normal rate and regular rhythm. Pulses: Normal pulses. Heart sounds: Normal heart sounds. Pulmonary:      Effort: Pulmonary effort is normal.      Breath sounds: Normal breath sounds. Abdominal:      General: Bowel sounds are normal. There is no distension. Palpations: Abdomen is soft. Tenderness: There is no abdominal tenderness. Musculoskeletal:         General: No signs of injury. Normal range of motion. Cervical back: Normal range of motion and neck supple. Skin:     General: Skin is warm and dry. Capillary Refill: Capillary refill takes less than 2 seconds. Neurological:      General: No focal deficit present. Mental Status: He is alert and oriented to person, place, and time. Psychiatric:         Mood and Affect: Mood normal.         Behavior: Behavior normal.         Diagnostic Study Results     Labs - I have personally reviewed and interpreted all laboratory results.  Terrance Lopez MD, MSc  Recent Results (from the past 24 hour(s))   EKG, 12 LEAD, INITIAL    Collection Time: 06/17/21  1:49 PM   Result Value Ref Range    Ventricular Rate 77 BPM    Atrial Rate 77 BPM    P-R Interval 198 ms    QRS Duration 94 ms    Q-T Interval 392 ms    QTC Calculation (Bezet) 443 ms    Calculated P Axis 37 degrees    Calculated R Axis 29 degrees    Calculated T Axis 105 degrees    Diagnosis       Normal sinus rhythm  Nonspecific T wave abnormality  Abnormal ECG  No previous ECGs available  Confirmed by Lalito Cruz MD, --- ([id number]) on 6/17/2021 3:10:15 PM     CBC WITH AUTOMATED DIFF    Collection Time: 06/17/21  2:00 PM   Result Value Ref Range    WBC 7.7 4.1 - 11.1 K/uL    RBC 4.14 4.10 - 5.70 M/uL    HGB 11.0 (L) 12.1 - 17.0 g/dL    HCT 34.8 (L) 36.6 - 50.3 %    MCV 84.1 80.0 - 99.0 FL    MCH 26.6 26.0 - 34.0 PG    MCHC 31.6 30.0 - 36.5 g/dL    RDW 13.9 11.5 - 14.5 %    PLATELET 723 693 - 033 K/uL    MPV 11.3 8.9 - 12.9 FL    NRBC 0.0 0  WBC    ABSOLUTE NRBC 0.00 0.00 - 0.01 K/uL    NEUTROPHILS 51 32 - 75 %    LYMPHOCYTES 34 12 - 49 %    MONOCYTES 7 5 - 13 %    EOSINOPHILS 7 0 - 7 %    BASOPHILS 1 0 - 1 %    IMMATURE GRANULOCYTES 0 0.0 - 0.5 %    ABS. NEUTROPHILS 3.9 1.8 - 8.0 K/UL    ABS. LYMPHOCYTES 2.6 0.8 - 3.5 K/UL    ABS. MONOCYTES 0.6 0.0 - 1.0 K/UL    ABS. EOSINOPHILS 0.5 (H) 0.0 - 0.4 K/UL    ABS. BASOPHILS 0.1 0.0 - 0.1 K/UL    ABS. IMM. GRANS. 0.0 0.00 - 0.04 K/UL    DF AUTOMATED     METABOLIC PANEL, COMPREHENSIVE    Collection Time: 06/17/21  2:00 PM   Result Value Ref Range    Sodium 140 136 - 145 mmol/L    Potassium 3.6 3.5 - 5.1 mmol/L    Chloride 103 97 - 108 mmol/L    CO2 26 21 - 32 mmol/L    Anion gap 11 5 - 15 mmol/L    Glucose 228 (H) 65 - 100 mg/dL    BUN 16 6 - 20 MG/DL    Creatinine 1.27 0.70 - 1.30 MG/DL    BUN/Creatinine ratio 13 12 - 20      GFR est AA >60 >60 ml/min/1.73m2    GFR est non-AA 59 (L) >60 ml/min/1.73m2    Calcium 10.1 8.5 - 10.1 MG/DL    Bilirubin, total 0.2 0.2 - 1.0 MG/DL    ALT (SGPT) 48 12 - 78 U/L    AST (SGOT) 22 15 - 37 U/L    Alk.  phosphatase 109 45 - 117 U/L    Protein, total 7.7 6.4 - 8.2 g/dL    Albumin 3.5 3.5 - 5.0 g/dL    Globulin 4.2 (H) 2.0 - 4.0 g/dL    A-G Ratio 0.8 (L) 1.1 - 2.2     TROPONIN I    Collection Time: 06/17/21  2:00 PM   Result Value Ref Range    Troponin-I, Qt. <0.05 <0.05 ng/mL   PROTHROMBIN TIME + INR    Collection Time: 06/17/21  2:00 PM   Result Value Ref Range    INR 1.1 0.9 - 1.1      Prothrombin time 10.4 9.0 - 11.1 sec   D DIMER    Collection Time: 06/17/21  2:00 PM   Result Value Ref Range    D-dimer 1.02 (H) 0.00 - 0.65 mg/L FEU   EKG, 12 LEAD, INITIAL    Collection Time: 06/17/21  6:49 PM   Result Value Ref Range    Ventricular Rate 74 BPM    Atrial Rate 74 BPM    P-R Interval 200 ms    QRS Duration 84 ms    Q-T Interval 370 ms    QTC Calculation (Bezet) 410 ms    Calculated P Axis 34 degrees    Calculated R Axis 25 degrees    Calculated T Axis 130 degrees    Diagnosis       Normal sinus rhythm  T wave abnormality, consider lateral ischemia  No previous ECGs available     TROPONIN I    Collection Time: 06/17/21  7:45 PM   Result Value Ref Range    Troponin-I, Qt. <0.05 <0.05 ng/mL       Radiologic Studies - I have personally reviewed and interpreted all imaging studies and agree with radiology interpretation and report. Jr Morales MD, MSc  CTA CHEST W OR W WO CONT   Final Result      1. No evidence of acute aortic syndrome. No evidence of pulmonary embolism. No   evidence of acute process in the chest.               Medical Decision Making   I am the first provider for this patient. Records Reviewed: I reviewed our electronic medical record system for any past medical records that were available that may contribute to the patient's current condition, including their PMH, surgical history, social and family history. Reviewed the nursing notes and vital signs from today's visit. Nursing notes will be reviewed as they become available in realtime while the pt has been in the ED. In addition, I read most recent discharge summaries, if available and reviewed prior ECGs or imaging studies for comparison purposes. Gilson Winters MD Msc    Vital Signs-Reviewed the patient's vital signs.   Patient Vitals for the past 24 hrs:   Temp Pulse Resp BP SpO2   06/17/21 2122  74 22 129/80 97 %   06/17/21 2108  70 25 133/81 99 %   06/17/21 2030  75 19 (!) 180/107 99 %   06/17/21 2000  67 19 (!) 194/109 98 % 06/17/21 1952  74 23 (!) 185/112 96 %   06/17/21 1948  71  (!) 209/108    06/17/21 1930  73 23 (!) 209/108 99 %   06/17/21 1900  82 28 (!) 198/112 99 %   06/17/21 1846     99 %   06/17/21 1840 98.2 °F (36.8 °C) 75 25 (!) 178/102 100 %       ECG interpretation: Normal sinus rhythm with rate 74, normal intervals, no ST elevations, depressions or other changes concerning for acute ischemia. This ECG has been viewed and interpreted by me personally. Luz Quintanilla MD, Msc  - no changes since ECG earlier today at 13:49 - Jayashree Gallo MD      Provider Notes (Medical Decision Making):   Patient with multiple risk factors coronary artery disease, heart score of 5, presents to emergency department with chest pain and back pain. This is all in setting of severe potentially malignant hypertension. He is otherwise hemodynamically stable. Based on my interpretation of his blood work and imaging done prior to transfer, for Trop is negative and he will need a repeat troponin here now, chest x-ray shows no acute findings, rest of labs are without acute abnormalities. DDx: ACS, hypertensive emergency versus malignant hypertension, GERD, aortic dissection, PE    The patient usually gets his care at the [place]. That hospital was on diversion today which is why he was transferred here to us. We do not have any prior hospital records for him. Plan and management[de-identified] Labetalol 20 mg IV now, 2 sublingual nitroglycerin, 1 inch Nitropaste. Nicardipine ggt. -Repeat troponin and ECG  -CTA chest  Of note, patient is noted to have an iodine allergy which is why he did not get a CT of his chest prior to transfer. I spoken with the patient about this iodine allergy and it turns out that he is actually never had an allergic reaction to any contrast administration or anything else ever done to him in the hospital.  The only allergy he is ever had was to shellfish.   Given the low likelihood of having a contrast allergy in setting of shellfish, no indication for pre-treatment. We will get CTA immediately. ED Course:   Initial assessment performed. The patients presenting problems have been discussed, and they are in agreement with the care plan formulated and outlined with them. I have encouraged them to ask questions as they arise throughout their visit. 9:45 PM  Based on my review and interpretation of the patient's laboratory results as well as CTA of chest, notable findings are secondary to troponin which is reassuring but will require additional trending for full ACS rule out. In addition, the patient's chest pain now has improved and is down to 4/10 but not resolved. This is concerning. I agree with transferring providers plan to admit on telemetry with cardiology consultation in the morning particularly if the chest pain persists. Patient's hypertension is significant and concerning as also the potential etiology of the chest pain. He is normally on 4 blood pressure medications, states that his blood pressure is much better controlled at baseline. I have given him IV labetalol and together with the sublingual nitroglycerin the blood pressure started coming down. Nicardipine drip has also been ordered. CT of the chest shows no acute abnormalities there. Consult Note:  Sandie Antonio MD spoke with  hospitalist,   Discussed pt's hx, physical exam and available diagnostic and imaging results. Reviewed care plans. Agree with management and plan thus far. DISPOSITION: ADMIT  Patient is being admitted to the hospital.  Their test results and reasons for admission have been discussed. The patient and/or available family express agreement with and understanding of the need to be admitted and their admission diagnosis. Thank you for resuming the care of this patient. Please don't hesitate to contact me in the emergency department if you  have any additional questions.     Sandie Antonio MD, MSc    CRITICAL CARE NOTE :    IMPENDING DETERIORATION -Cardiovascular  ASSOCIATED RISK FACTORS - CNS Decompensation, HTN  MANAGEMENT- Bedside Assessment and Transfer  INTERPRETATION -  Xrays, CT Scan, ECG and Blood Pressure  INTERVENTIONS - hemodynamic mngmt and vascular control  CASE REVIEW - Hospitalist/Intensivist and Nursing  TREATMENT RESPONSE -Improved  PERFORMED BY - Self    NOTES   :    I have spent 40 minutes of critical care time involved in lab review, consultations with specialist, family decision- making, bedside attention and documentation. During this entire length of time I was immediately available to the patient . Critical Care: The reason for providing this level of medical care for this critically ill patient was due to a critical illness that impaired one or more vital organ systems, such that there was a high probability of imminent or life threatening deterioration in the patient's condition. This care involved high complexity decision making to assess, manipulate, and support vital system functions, to treat this degree of vital organ system failure, and to prevent further life threatening deterioration of the patients condition. Campbell Link MD      I, Harley Schmidt MD, am the attending of record for this patient encounter. Diagnosis     Clinical Impression:   1. HTN (hypertension), malignant    2. Obesity with serious comorbidity, unspecified classification, unspecified obesity type    3. Chest pain, unspecified type        Attestation:  I personally performed the services described in this documentation on this date 6/17/2021 for patient Louise Gutiérrez.   Campbell Link MD

## 2021-06-18 NOTE — DISCHARGE SUMMARY
Hospitalist Discharge Summary     Patient ID:  Renuka Suarez  [id number]  18 y.o.  1965 6/17/2021    PCP on record: None    Admit date: 6/17/2021  Discharge date and time: 6/18/2021    DISCHARGE DIAGNOSIS:  See below    CONSULTATIONS:  None    Excerpted HPI from H&P of Henna Key MD:  Renuka Suarez is a 48 y.o.  male who presents to our emergency room with the above CC as a transfer from an outside hospital.  Patient was at his usual state of health until 4 days ago when he started having back pain, more on the mid upper and to the left upper side as well, pain became so severe and traveled to the left side of the chest, left upper extremity and left lower extremity and was also associated with numbness of the left leg. Pain was so severe that patient cannot lay on his back.    ______________________________________________________________________  DISCHARGE SUMMARY/HOSPITAL COURSE:  for full details see H&P, daily progress notes, labs, consult notes. Hypertensive urgency - resolved  Acute back pain, radiating around to his chest - resolved, possible spasm  CTA Chest negative  CT Spine negative  Pt now pain free and endorses resolution of all his complaints  He is stable for discharge and plans to follow up with Dr. Jaron Jenkins, his [place] PCP. Will continue home BP meds and just as noted below  _____________________________________________________________________  Patient seen and examined by me on discharge day. Pertinent Findings:  Gen:    Not in distress  Chest: Clear lungs  CVS:   Regular rhythm. No edema  Abd:  Soft, not distended, not tender  Neuro:  Alert, oriented x3  _______________________________________________________________________  DISCHARGE MEDICATIONS:   Current Discharge Medication List      CONTINUE these medications which have CHANGED    Details   hydroCHLOROthiazide (HYDRODIURIL) 25 mg tablet Take 2 Tablets by mouth daily.   Qty: 30 Tablet, Refills: 1  Start date: 6/18/2021         CONTINUE these medications which have NOT CHANGED    Details   oxyCODONE-acetaminophen (PERCOCET) 5-325 mg per tablet Take 1 Tab by mouth every four (4) hours as needed for Pain. Max Daily Amount: 6 Tabs. Qty: 6 Tab, Refills: 0    Associated Diagnoses: Chronic pain of left knee      amLODIPine (NORVASC) 10 mg tablet Take  by mouth daily. aspirin 81 mg chewable tablet Take 81 mg by mouth daily. atorvastatin (LIPITOR) 80 mg tablet Take 80 mg by mouth daily. docusate sodium (COLACE) 100 mg capsule Take 100 mg by mouth two (2) times a day. lisinopril (PRINIVIL, ZESTRIL) 40 mg tablet Take 40 mg by mouth daily. metoprolol tartrate (LOPRESSOR) 50 mg tablet Take 50 mg by mouth two (2) times a day. minocycline (MINOCIN, DYNACIN) 100 mg capsule Take 100 mg by mouth. omeprazole (PRILOSEC) 20 mg capsule Take 20 mg by mouth daily. prazosin (MINIPRESS) 2 mg capsule Take 2 mg by mouth daily. sennosides 8.6 mg cap Take 1 Tab by mouth. venlafaxine (EFFEXOR) 75 mg tablet Take 75 mg by mouth three (3) times daily. Patient Follow Up Instructions:    Activity: Activity as tolerated  Diet: Resume previous diet  Wound Care: None needed      Follow-up Information     Follow up With Specialties Details Why Contact Info    None    None ([phone]) Patient stated that they have no PCP          ________________________________________________________________    Risk of deterioration: Low    Condition at Discharge:  Stable  __________________________________________________________________    Disposition  Home with family, no needs    ____________________________________________________________________    Code Status: Full Code  ___________________________________________________________________      Total time in minutes spent coordinating this discharge (includes going over instructions, follow-up, prescriptions, and preparing report for sign off to her PCP) :  >30 minutes    Signed:  Mohsen Garrido MD

## 2021-06-18 NOTE — H&P
Hospitalist Admission Note    NAME: Terri Peacock   :  1965   MRN:  [id number]     Date/Time:  2021 11:25 PM    Patient PCP: None  ______________________________________________________________________  Given the patient's current clinical presentation, I have a high level of concern for decompensation if discharged from the emergency department. Complex decision making was performed, which includes reviewing the patient's available past medical records, laboratory results, and x-ray films. My assessment of this patient's clinical condition and my plan of care is as follows. Assessment / Plan:  Hypertensive urgency  Acute back pain/chest pain  Place under observation in telemetry floor  Patient describes his pain as upper mid back and more to the left side around left shoulder blade radiating to the left chest, left upper arm and left lower extremity with episodes of numbness of left lower extremity. We will get CT thoracic spine and will consider MRI based on the results  Pain control  Blood pressure improved significantly and patient did not require any drips. Hydralazine IV as needed for uncontrolled hypertension  Resume antihypertensive medications  Resume rest of home medications  Pain control    Code Status: full   Surrogate Decision Maker: wife  DVT Prophylaxis: lovenox  GI Prophylaxis: not indicated  Baseline: independent       Subjective:   CHIEF COMPLAINT: back pain     HISTORY OF PRESENT ILLNESS:     Terri Peacock is a 48 y.o.  male who presents to our emergency room with the above CC as a transfer from an outside hospital.  Patient was at his usual state of health until 4 days ago when he started having back pain, more on the mid upper and to the left upper side as well, pain became so severe and traveled to the left side of the chest, left upper extremity and left lower extremity and was also associated with numbness of the left leg.   Pain was so severe that patient cannot lay on his back. Patient denies fever, chills, falls, back trauma, heavy exercise and lifting heavy objects. Blood work not that remarkable other than for glucose of 228. Troponin negative x2. CTA CHEST W OR W WO CONT    Result Date: 6/17/2021  1. No evidence of acute aortic syndrome. No evidence of pulmonary embolism. No evidence of acute process in the chest.     XR CHEST PORT    Result Date: 6/17/2021  No acute process on portable chest.      EKG with sinus rhythm and T wave inversion in lateral leads. .  We were asked to admit for work up and evaluation of the above problems. Past Medical History:   Diagnosis Date    Diabetes ([address]     Hyperlipidemia     Hypertension         No past surgical history on file. Social History     Tobacco Use    Smoking status: Never Smoker    Smokeless tobacco: Never Used   Substance Use Topics    Alcohol use: No        History reviewed. No pertinent family history. Allergies   Allergen Reactions    Morphine Other (comments)     hallucinations    Shellfish Derived Hives     No reaction to iodine contrast on 6/17/21        Prior to Admission medications    Medication Sig Start Date End Date Taking? Authorizing Provider   oxyCODONE-acetaminophen (PERCOCET) 5-325 mg per tablet Take 1 Tab by mouth every four (4) hours as needed for Pain. Max Daily Amount: 6 Tabs. 7/29/18   Lindy Mata., MD   amLODIPine (NORVASC) 10 mg tablet Take  by mouth daily. Kym Joya MD   aspirin 81 mg chewable tablet Take 81 mg by mouth daily. Kym Joya MD   atorvastatin (LIPITOR) 80 mg tablet Take 80 mg by mouth daily. Kym Joya MD   docusate sodium (COLACE) 100 mg capsule Take 100 mg by mouth two (2) times a day. Kym Joya MD   hydroCHLOROthiazide (HYDRODIURIL) 25 mg tablet Take 25 mg by mouth daily. Kym Joya MD   lisinopril (PRINIVIL, ZESTRIL) 40 mg tablet Take 40 mg by mouth daily.     Kym Joya MD   metoprolol tartrate (LOPRESSOR) 50 mg tablet Take 50 mg by mouth two (2) times a day. Kym Joya MD   minocycline (MINOCIN, DYNACIN) 100 mg capsule Take 100 mg by mouth. Kym Joya MD   omeprazole (PRILOSEC) 20 mg capsule Take 20 mg by mouth daily. Kym Joya MD   prazosin (MINIPRESS) 2 mg capsule Take 2 mg by mouth daily. Kym Joya MD   sennosides 8.6 mg cap Take 1 Tab by mouth. Kym Joya MD   venlafaxine (EFFEXOR) 75 mg tablet Take 75 mg by mouth three (3) times daily. Kym Joya MD       REVIEW OF SYSTEMS:     Total of 12 systems reviewed, negative except for the above. Objective:   VITALS:    Visit Vitals  /82   Pulse 74   Temp 98.2 °F (36.8 °C)   Resp (!) 32   Ht 5' 11\" (1.803 m)   Wt 129.7 kg (286 lb)   SpO2 98%   BMI 39.89 kg/m²     No intake or output data in the 24 hours ending 06/17/21 2325   Wt Readings from Last 10 Encounters:   06/17/21 129.7 kg (286 lb)   06/17/21 129.7 kg (286 lb)   10/05/18 108.9 kg (240 lb)   07/29/18 124.7 kg (275 lb)   05/03/18 124.7 kg (275 lb)   01/01/18 131.5 kg (290 lb)       PHYSICAL EXAM:    General:    Alert, cooperative, no distress, appears stated age. HEENT: Atraumatic, anicteric sclerae, pink conjunctivae, MMM  Neck:  Supple, symmetrical  Lungs:   CTA. No Wheezing/Rhonchi. No rales. Left sided chest tenderness. No Accessory muscle use. Heart:   Regular rhythm. No murmur. No JVD   Abdomen:   Soft, NT. ND.  BS normal  Extremities: No edema. No cyanosis. No clubbing. Upper mid back and left upper back tenderness. Skin:     Not pale. Not Jaundiced. No rashes   Psych:  Good insight. Not depressed. Not anxious or agitated. Neurologic: Alert and oriented X 4. EOMs intact. No facial asymmetry. No slurred speech.  Symmetrical strength, Sensation grossly intact.     _______________________________________________________________________  Care Plan discussed with:    Comments   Patient x    Family      RN     Care Manager                    Consultant: _______________________________________________________________________  Expected  Disposition:   Home with Family x   HH/PT/OT/RN    SNF/LTC    GUILLERMO    ________________________________________________________________________  TOTAL TIME:  45 Minutes    Critical Care Provided     Minutes non procedure based      Comments     Reviewed previous records   >50% of visit spent in counseling and coordination of care x Discussion with patient and/or family and questions answered       ________________________________________________________________________  Signed: Angelo Street MD    Procedures: see electronic medical records for all procedures/Xrays and details which were not copied into this note but were reviewed prior to creation of Plan.     LAB DATA REVIEWED:    Recent Results (from the past 24 hour(s))   EKG, 12 LEAD, INITIAL    Collection Time: 06/17/21  1:49 PM   Result Value Ref Range    Ventricular Rate 77 BPM    Atrial Rate 77 BPM    P-R Interval 198 ms    QRS Duration 94 ms    Q-T Interval 392 ms    QTC Calculation (Bezet) 443 ms    Calculated P Axis 37 degrees    Calculated R Axis 29 degrees    Calculated T Axis 105 degrees    Diagnosis       Normal sinus rhythm  Nonspecific T wave abnormality  Abnormal ECG  No previous ECGs available  Confirmed by Nataliya Holliday MD, --- ([id number]) on 6/17/2021 3:10:15 PM     CBC WITH AUTOMATED DIFF    Collection Time: 06/17/21  2:00 PM   Result Value Ref Range    WBC 7.7 4.1 - 11.1 K/uL    RBC 4.14 4.10 - 5.70 M/uL    HGB 11.0 (L) 12.1 - 17.0 g/dL    HCT 34.8 (L) 36.6 - 50.3 %    MCV 84.1 80.0 - 99.0 FL    MCH 26.6 26.0 - 34.0 PG    MCHC 31.6 30.0 - 36.5 g/dL    RDW 13.9 11.5 - 14.5 %    PLATELET 566 059 - 153 K/uL    MPV 11.3 8.9 - 12.9 FL    NRBC 0.0 0  WBC    ABSOLUTE NRBC 0.00 0.00 - 0.01 K/uL    NEUTROPHILS 51 32 - 75 %    LYMPHOCYTES 34 12 - 49 %    MONOCYTES 7 5 - 13 %    EOSINOPHILS 7 0 - 7 %    BASOPHILS 1 0 - 1 %    IMMATURE GRANULOCYTES 0 0.0 - 0.5 %    ABS. NEUTROPHILS 3.9 1.8 - 8.0 K/UL    ABS. LYMPHOCYTES 2.6 0.8 - 3.5 K/UL    ABS. MONOCYTES 0.6 0.0 - 1.0 K/UL    ABS. EOSINOPHILS 0.5 (H) 0.0 - 0.4 K/UL    ABS. BASOPHILS 0.1 0.0 - 0.1 K/UL    ABS. IMM. GRANS. 0.0 0.00 - 0.04 K/UL    DF AUTOMATED     METABOLIC PANEL, COMPREHENSIVE    Collection Time: 06/17/21  2:00 PM   Result Value Ref Range    Sodium 140 136 - 145 mmol/L    Potassium 3.6 3.5 - 5.1 mmol/L    Chloride 103 97 - 108 mmol/L    CO2 26 21 - 32 mmol/L    Anion gap 11 5 - 15 mmol/L    Glucose 228 (H) 65 - 100 mg/dL    BUN 16 6 - 20 MG/DL    Creatinine 1.27 0.70 - 1.30 MG/DL    BUN/Creatinine ratio 13 12 - 20      GFR est AA >60 >60 ml/min/1.73m2    GFR est non-AA 59 (L) >60 ml/min/1.73m2    Calcium 10.1 8.5 - 10.1 MG/DL    Bilirubin, total 0.2 0.2 - 1.0 MG/DL    ALT (SGPT) 48 12 - 78 U/L    AST (SGOT) 22 15 - 37 U/L    Alk.  phosphatase 109 45 - 117 U/L    Protein, total 7.7 6.4 - 8.2 g/dL    Albumin 3.5 3.5 - 5.0 g/dL    Globulin 4.2 (H) 2.0 - 4.0 g/dL    A-G Ratio 0.8 (L) 1.1 - 2.2     TROPONIN I    Collection Time: 06/17/21  2:00 PM   Result Value Ref Range    Troponin-I, Qt. <0.05 <0.05 ng/mL   PROTHROMBIN TIME + INR    Collection Time: 06/17/21  2:00 PM   Result Value Ref Range    INR 1.1 0.9 - 1.1      Prothrombin time 10.4 9.0 - 11.1 sec   D DIMER    Collection Time: 06/17/21  2:00 PM   Result Value Ref Range    D-dimer 1.02 (H) 0.00 - 0.65 mg/L FEU   EKG, 12 LEAD, INITIAL    Collection Time: 06/17/21  6:49 PM   Result Value Ref Range    Ventricular Rate 74 BPM    Atrial Rate 74 BPM    P-R Interval 200 ms    QRS Duration 84 ms    Q-T Interval 370 ms    QTC Calculation (Bezet) 410 ms    Calculated P Axis 34 degrees    Calculated R Axis 25 degrees    Calculated T Axis 130 degrees    Diagnosis       Normal sinus rhythm  T wave abnormality, consider lateral ischemia  No previous ECGs available     TROPONIN I    Collection Time: 06/17/21  7:45 PM   Result Value Ref Range    Troponin-I, Qt. <0.05 <0.05 ng/mL     CTA CHEST W OR W WO CONT    Result Date: 6/17/2021  1. No evidence of acute aortic syndrome. No evidence of pulmonary embolism. No evidence of acute process in the chest.     XR CHEST PORT    Result Date: 6/17/2021  No acute process on portable chest.        Current Medications:     Current Facility-Administered Medications:     nitroglycerin (NITROSTAT) tablet 0.4 mg, 0.4 mg, SubLINGual, PRN, Zoila Scott MD, 0.4 mg at 06/17/21 1948    niCARdipine (CARDENE) 25 mg in 0.9% sodium chloride 250 mL infusion, 0-15 mg/hr, IntraVENous, TITRATE, Zoila Scott MD, Held at 06/17/21 2015    Current Outpatient Medications:     oxyCODONE-acetaminophen (PERCOCET) 5-325 mg per tablet, Take 1 Tab by mouth every four (4) hours as needed for Pain. Max Daily Amount: 6 Tabs., Disp: 6 Tab, Rfl: 0    amLODIPine (NORVASC) 10 mg tablet, Take  by mouth daily. , Disp: , Rfl:     aspirin 81 mg chewable tablet, Take 81 mg by mouth daily. , Disp: , Rfl:     atorvastatin (LIPITOR) 80 mg tablet, Take 80 mg by mouth daily. , Disp: , Rfl:     docusate sodium (COLACE) 100 mg capsule, Take 100 mg by mouth two (2) times a day., Disp: , Rfl:     hydroCHLOROthiazide (HYDRODIURIL) 25 mg tablet, Take 25 mg by mouth daily. , Disp: , Rfl:     lisinopril (PRINIVIL, ZESTRIL) 40 mg tablet, Take 40 mg by mouth daily. , Disp: , Rfl:     metoprolol tartrate (LOPRESSOR) 50 mg tablet, Take 50 mg by mouth two (2) times a day., Disp: , Rfl:     minocycline (MINOCIN, DYNACIN) 100 mg capsule, Take 100 mg by mouth., Disp: , Rfl:     omeprazole (PRILOSEC) 20 mg capsule, Take 20 mg by mouth daily. , Disp: , Rfl:     prazosin (MINIPRESS) 2 mg capsule, Take 2 mg by mouth daily. , Disp: , Rfl:     sennosides 8.6 mg cap, Take 1 Tab by mouth., Disp: , Rfl:     venlafaxine (EFFEXOR) 75 mg tablet, Take 75 mg by mouth three (3) times daily. , Disp: , Rfl:

## 2021-06-18 NOTE — PROGRESS NOTES
Problem: Falls - Risk of  Goal: *Absence of Falls  Description: Document Authhector Quirozers Fall Risk and appropriate interventions in the flowsheet.   Outcome: Progressing Towards Goal  Note: Fall Risk Interventions:  Mobility Interventions: Bed/chair exit alarm, Communicate number of staff needed for ambulation/transfer         Medication Interventions: Bed/chair exit alarm, Patient to call before getting OOB    Elimination Interventions: Bed/chair exit alarm, Call light in reach              Problem: Patient Education: Go to Patient Education Activity  Goal: Patient/Family Education  Outcome: Progressing Towards Goal

## 2021-06-18 NOTE — PROGRESS NOTES
Received message from patient's nurse Berenice Parra stating :    Patients am labs showed BG of 246. Patient has a history of DM but has no orders in for insulin     Discussion / orders:    Ordered before meals and at bedtime blood glucose checks with sliding scale coverage           Please note that this note was dictated using Dragon computer voice recognition software. Quite often unanticipated grammatical, syntax, homophones, and other interpretive errors are inadvertently transcribed by the computer software. Please disregard these errors. Please excuse any errors that have escaped final proofreading.

## 2021-06-18 NOTE — ED NOTES
Patient is being transferred to [facility] ICU, Room # 4695. Report given to MERCEDES Gayle on Terri Mouse for routine progression of care. Report consisted of the following information SBAR, Kardex and ED Summary. Patient transferred to receiving unit by: tech (RN or tech name). Outstanding consults needed: No     Next labs due: No     The following personal items will be sent with the patient during transfer to the floor: All valuables:    Cardiac monitoring ordered: Yes    The following CURRENT information was reported to the receiving RN:    Code status: Full Code at time of transfer    Last set of vital signs:  Vital Signs  Level of Consciousness: Alert (0) (06/17/21 1840)  Temp: 98.2 °F (36.8 °C) (06/17/21 1840)  Temp Source: Oral (06/17/21 1840)  Pulse (Heart Rate): 74 (06/17/21 2330)  Cardiac Rhythm: Sinus Rhythm (06/17/21 1846)  Resp Rate: 30 (06/17/21 2330)  BP: (!) 140/87 (06/17/21 2330)  MAP (Monitor): 99 (06/17/21 2330)  MAP (Calculated): 105 (06/17/21 2330)  BP 1 Location: Right upper arm (06/17/21 2122)  BP 1 Method: Automatic (06/17/21 2122)  BP Patient Position: At rest (06/17/21 2122)  MEWS Score: 2 (06/17/21 1840)         Oxygen Therapy  O2 Sat (%): 97 % (06/17/21 2330)  Pulse via Oximetry: 74 beats per minute (06/17/21 2330)  O2 Device: None (Room air) (06/17/21 2130)      Last pain assessment:  Pain 1  Pain Scale 1: Numeric (0 - 10)  Pain Intensity 1: 6  Patient Stated Pain Goal: 0  Pain Location 1: Chest  Pain Description 1: Sharp  Pain Intervention(s) 1: Medication (see MAR)      Wounds: No     Urinary catheter: voiding  Is there a perez order: No     LDAs:       Peripheral IV Left Antecubital (Active)         Opportunity for questions and clarification was provided.     Angelo Moore

## 2021-06-18 NOTE — PROGRESS NOTES
End of Shift Note    Bedside shift change report given to [facility] (oncoming nurse) by SHELYB Feng (offgoing nurse). Report included the following information SBAR    Shift worked:  [id number]   Shift summary and any significant changes:     Admitted to [facility]; PRN Percocet given x1; patient had no orders for BG checks, orders put in for ACHS coverage        Concerns for physician to address:  None   Zone phone for oncoming shift:   [phone]     Patient Information  Dell Robles  48 y.o.  6/17/2021  6:29 PM by Tanja Bar MD. Dell Robles was admitted from Home    Problem List  Patient Active Problem List    Diagnosis Date Noted    Back pain 06/18/2021    Hypertensive urgency 06/17/2021     Past Medical History:   Diagnosis Date    Diabetes ([facility] [address]     Hyperlipidemia     Hypertension        Core Measures:  CVA: No No  CHF:No No  PNA:No No    Activity:  Activity Level: Up with Assistance  Number times ambulated in hallways past shift: 0  Number of times OOB to chair past shift: 0    Cardiac:   Cardiac Monitoring: Yes      Cardiac Rhythm: Sinus Rhythm    Access:   Current line(s): PIV     Genitourinary:   Urinary status: voiding    Respiratory:   O2 Device: None (Room air)  Chronic home O2 use?: NO  Incentive spirometer at bedside: N/A       GI:  Last Bowel Movement Date: 06/18/21  Current diet:  ADULT DIET Regular; 5 carb choices (75 gm/meal); Low Fat/Low Chol/High Fiber/PAVAN; No Salt Added (3-4 gm)  Passing flatus: YES  Tolerating current diet: YES       Pain Management:   Patient states pain is manageable on current regimen: YES    Skin:  Stiven Score: 21  Interventions: increase time out of bed and limit briefs    Patient Safety:  Fall Score:  Total Score: 3  Interventions: bed/chair alarm, gripper socks and pt to call before getting OOB  High Fall Risk: Yes  @Rollbelt  @dexterity to release roll belt  Yes/No ( must document dexterity  here by stating Yes or No here, otherwise this is a restraint and must follow restraint documentation policy.)    DVT prophylaxis:  DVT prophylaxis Med- Yes  DVT prophylaxis SCD or SUSAN- No     Wounds: (If Applicable)  Wounds- No  Location     Active Consults:  None    Length of Stay:  Expected LOS: - - -  Actual LOS: 1  Discharge Plan: No; SHELBY Saldana

## 2021-06-18 NOTE — PROGRESS NOTES
Discharge instructions gone over with patient. Patient stated that he has no further questions at this time and he is ready to go. His ride should be here within the next hour and his IV has been removed.

## 2022-02-11 ENCOUNTER — HOSPITAL ENCOUNTER (EMERGENCY)
Age: 57
Discharge: HOME | End: 2022-02-11
Attending: EMERGENCY MEDICINE

## 2022-02-11 ENCOUNTER — APPOINTMENT (OUTPATIENT)
Dept: GENERAL RADIOLOGY | Age: 57
End: 2022-02-11
Attending: EMERGENCY MEDICINE

## 2022-02-11 VITALS
WEIGHT: 295 LBS | BODY MASS INDEX: 41.3 KG/M2 | HEIGHT: 71 IN | TEMPERATURE: 98.3 F | RESPIRATION RATE: 18 BRPM | HEART RATE: 93 BPM | OXYGEN SATURATION: 99 % | SYSTOLIC BLOOD PRESSURE: 181 MMHG | DIASTOLIC BLOOD PRESSURE: 118 MMHG

## 2022-02-11 DIAGNOSIS — M79.2 RADICULAR PAIN IN RIGHT ARM: Primary | ICD-10-CM

## 2022-02-11 DIAGNOSIS — M25.511 ACUTE PAIN OF RIGHT SHOULDER: ICD-10-CM

## 2022-02-11 PROCEDURE — 96372 THER/PROPH/DIAG INJ SC/IM: CPT

## 2022-02-11 PROCEDURE — 74011250637 HC RX REV CODE- 250/637: Performed by: EMERGENCY MEDICINE

## 2022-02-11 PROCEDURE — 73030 X-RAY EXAM OF SHOULDER: CPT

## 2022-02-11 PROCEDURE — 72040 X-RAY EXAM NECK SPINE 2-3 VW: CPT

## 2022-02-11 PROCEDURE — 99283 EMERGENCY DEPT VISIT LOW MDM: CPT

## 2022-02-11 PROCEDURE — 74011250636 HC RX REV CODE- 250/636: Performed by: EMERGENCY MEDICINE

## 2022-02-11 RX ORDER — PREDNISONE 10 MG/1
TABLET ORAL
Qty: 18 TABLET | Refills: 0 | Status: SHIPPED | OUTPATIENT
Start: 2022-02-11 | End: 2022-02-20

## 2022-02-11 RX ORDER — CYCLOBENZAPRINE HCL 10 MG
10 TABLET ORAL
Qty: 20 TABLET | Refills: 0 | Status: SHIPPED | OUTPATIENT
Start: 2022-02-11

## 2022-02-11 RX ORDER — KETOROLAC TROMETHAMINE 30 MG/ML
60 INJECTION, SOLUTION INTRAMUSCULAR; INTRAVENOUS
Status: COMPLETED | OUTPATIENT
Start: 2022-02-11 | End: 2022-02-11

## 2022-02-11 RX ORDER — OXYCODONE AND ACETAMINOPHEN 5; 325 MG/1; MG/1
1 TABLET ORAL
Status: COMPLETED | OUTPATIENT
Start: 2022-02-11 | End: 2022-02-11

## 2022-02-11 RX ORDER — CYCLOBENZAPRINE HCL 10 MG
10 TABLET ORAL
Status: COMPLETED | OUTPATIENT
Start: 2022-02-11 | End: 2022-02-11

## 2022-02-11 RX ADMIN — CYCLOBENZAPRINE 10 MG: 10 TABLET, FILM COATED ORAL at 03:38

## 2022-02-11 RX ADMIN — OXYCODONE AND ACETAMINOPHEN 1 TABLET: 5; 325 TABLET ORAL at 04:16

## 2022-02-11 RX ADMIN — METHYLPREDNISOLONE SODIUM SUCCINATE 125 MG: 125 INJECTION, POWDER, FOR SOLUTION INTRAMUSCULAR; INTRAVENOUS at 04:17

## 2022-02-11 RX ADMIN — KETOROLAC TROMETHAMINE 60 MG: 30 INJECTION, SOLUTION INTRAMUSCULAR at 03:39

## 2022-02-11 NOTE — ED TRIAGE NOTES
Pain from neck into rt shoulder has been going on for a while , has had PT and acupuncture.  Pain getting worse with decreased ROM

## 2022-02-11 NOTE — ED PROVIDER NOTES
EMERGENCY DEPARTMENT HISTORY AND PHYSICAL EXAM      Date: 2/11/2022  Patient Name: Kathie Churchill    History of Presenting Illness     Chief Complaint   Patient presents with    Back Pain       History Provided By: Patient    HPI:   The history is provided by the patient. Back Pain   This is a new problem. The current episode started more than 1 week ago. The problem has been gradually worsening. The problem occurs constantly. The pain is associated with no known injury. The pain is present in the thoracic spine. The pain is severe. The symptoms are aggravated by certain positions. The pain is the same all the time. Associated symptoms include numbness, paresthesias and tingling. Pertinent negatives include no chest pain, no fever, no headaches, no abdominal pain, no dysuria, no paresis and no weakness. He has tried NSAIDs and analgesics for the symptoms. The treatment provided mild relief. Kathie Churchill, 55 y.o. male  presents to the ED with cc of right upper back neck shoulder pain. Patient reports he injured his shoulder and has been in physical therapy, he was doing acupuncture and reports he thinks they put a needle in too deep. Pain is been there x9 days starts in the top of the shoulder side of his neck and radiates down the arm. Reports numbness and tingling in the hand no weakness. He denies any other injury. Pain is worse over the last 2 to 3 days, he has oxycodone is been taken at home without relief. He does have history of diabetes. He comes in for further evaluation. He denies any chest pain or shortness of breath. Denies abdominal pain. He also reports history of chronic back pain. There are no other complaints, changes, or physical findings at this time. PCP: None    No current facility-administered medications on file prior to encounter.      Current Outpatient Medications on File Prior to Encounter   Medication Sig Dispense Refill    oxyCODONE-acetaminophen (PERCOCET) 5-325 mg per tablet Take 1 Tab by mouth every four (4) hours as needed for Pain. Max Daily Amount: 6 Tabs. 6 Tab 0    hydroCHLOROthiazide (HYDRODIURIL) 25 mg tablet Take 2 Tablets by mouth daily. 30 Tablet 1    amLODIPine (NORVASC) 10 mg tablet Take  by mouth daily.  aspirin 81 mg chewable tablet Take 81 mg by mouth daily.  atorvastatin (LIPITOR) 80 mg tablet Take 80 mg by mouth daily.  docusate sodium (COLACE) 100 mg capsule Take 100 mg by mouth two (2) times a day.  lisinopril (PRINIVIL, ZESTRIL) 40 mg tablet Take 40 mg by mouth daily.  metoprolol tartrate (LOPRESSOR) 50 mg tablet Take 50 mg by mouth two (2) times a day.  minocycline (MINOCIN, DYNACIN) 100 mg capsule Take 100 mg by mouth.  omeprazole (PRILOSEC) 20 mg capsule Take 20 mg by mouth daily.  prazosin (MINIPRESS) 2 mg capsule Take 2 mg by mouth daily.  sennosides 8.6 mg cap Take 1 Tab by mouth.  venlafaxine (EFFEXOR) 75 mg tablet Take 75 mg by mouth three (3) times daily. Past History     Past Medical History:  Past Medical History:   Diagnosis Date    Arthritis     Diabetes ([address]     Hyperlipidemia     Hypertension        Past Surgical History:  History reviewed. No pertinent surgical history. Family History:  History reviewed. No pertinent family history. Social History:  Social History     Tobacco Use    Smoking status: Never Smoker    Smokeless tobacco: Never Used   Substance Use Topics    Alcohol use: No    Drug use: Never       Allergies: Allergies   Allergen Reactions    Morphine Other (comments)     hallucinations    Shellfish Derived Hives     No reaction to iodine contrast on 6/17/21         Review of Systems   Review of Systems   Constitutional: Negative. Negative for chills and fever. HENT: Negative. Negative for congestion and sore throat. Eyes: Negative. Negative for discharge and redness. Respiratory: Negative. Negative for cough and shortness of breath. Cardiovascular: Negative. Negative for chest pain and palpitations. Gastrointestinal: Negative. Negative for abdominal pain, nausea and vomiting. Endocrine: Negative. Negative for polydipsia and polyuria. Genitourinary: Negative. Negative for dysuria, flank pain and frequency. Musculoskeletal: Positive for arthralgias, back pain and neck pain. Negative for neck stiffness. Skin: Negative. Negative for rash and wound. Allergic/Immunologic: Negative. Neurological: Positive for tingling, numbness and paresthesias. Negative for dizziness, weakness and headaches. Hematological: Negative. Negative for adenopathy. Does not bruise/bleed easily. Psychiatric/Behavioral: Negative. Negative for confusion. The patient is not nervous/anxious. All other systems reviewed and are negative. Physical Exam   Physical Exam  Vitals and nursing note reviewed. Constitutional:       General: He is not in acute distress. Appearance: Normal appearance. He is well-developed. He is not ill-appearing, toxic-appearing or diaphoretic. Comments: Male sitting in bed appears in pain   HENT:      Head: Normocephalic and atraumatic. Nose: Nose normal.      Mouth/Throat:      Mouth: Mucous membranes are moist.      Pharynx: Oropharynx is clear. Eyes:      Extraocular Movements: Extraocular movements intact. Conjunctiva/sclera: Conjunctivae normal.      Pupils: Pupils are equal, round, and reactive to light. Cardiovascular:      Rate and Rhythm: Normal rate and regular rhythm. Pulses: Normal pulses. Pulmonary:      Effort: Pulmonary effort is normal. No respiratory distress. Abdominal:      General: There is no distension. Palpations: Abdomen is soft. Musculoskeletal:         General: Tenderness present. No swelling, deformity or signs of injury. Normal range of motion. Right shoulder: Tenderness present. Cervical back: Normal range of motion and neck supple.  Tenderness present. No rigidity. No muscular tenderness. Back:    Skin:     General: Skin is warm and dry. Capillary Refill: Capillary refill takes less than 2 seconds. Findings: No erythema or rash. Neurological:      General: No focal deficit present. Mental Status: He is alert and oriented to person, place, and time. Mental status is at baseline. Cranial Nerves: No cranial nerve deficit. Sensory: No sensory deficit. Motor: No weakness. Psychiatric:         Mood and Affect: Mood normal.         Behavior: Behavior normal.         Thought Content: Thought content normal.         Judgment: Judgment normal.         Diagnostic Study Results     Labs -   No results found for this or any previous visit (from the past 12 hour(s)). Radiologic Studies -   XR SPINE CERV PA LAT ODONT 3 V MAX   Final Result      No acute process. If persistent concern for acute fracture consider completion   series or CT. XR SHOULDER RT AP/LAT MIN 2 V   Final Result   No acute process. CT Results  (Last 48 hours)    None        CXR Results  (Last 48 hours)    None          Medical Decision Making   I am the first provider for this patient. I reviewed the vital signs, available nursing notes, past medical history, past surgical history, family history and social history. Vital Signs-Reviewed the patient's vital signs. Patient Vitals for the past 12 hrs:   Temp Pulse Resp BP SpO2   02/11/22 0259 98.3 °F (36.8 °C) 93 18 (!) 181/118 99 %             Records Reviewed: Nursing Notes and Old Medical Records    Provider Notes (Medical Decision Making):   Patient presents with neck shoulder pain, no recent injury. Will obtain x-rays and medicate for symptoms. ED Course:   Initial assessment performed. The patients presenting problems have been discussed, and they are in agreement with the care plan formulated and outlined with them.   I have encouraged them to ask questions as they arise throughout their visit. ED Course as of 02/11/22 0409 Fri Feb 11, 2022   0405 X-ray results with patient plan for steroids. Patient is diabetic he will monitor his blood sugars at home given self-loathing scale insulin. Patient instructed to stop steroids his blood sugars go over 300 350. [MF]      ED Course User Index  [MF] Denys Muhammad MD         Medications Given in the ED:    Medications   methylPREDNISolone (PF) (Solu-MEDROL) injection 125 mg (has no administration in time range)   oxyCODONE-acetaminophen (PERCOCET) 5-325 mg per tablet 1 Tablet (has no administration in time range)   ketorolac (TORADOL) injection 60 mg (60 mg IntraMUSCular Given 2/11/22 0339)   cyclobenzaprine (FLEXERIL) tablet 10 mg (10 mg Oral Given 2/11/22 0338)           4:08 AM    Presents with shoulder and arm pain after receiving acupuncture, symptoms are all particular with numbness and tingling in the hand. X-ray revealed degenerative changes shoulder x-ray reveals no evidence of fracture. He is diabetic will but will place on a short course of low-dose steroids he will monitor his blood sugars and stop if sugars are elevated. He will be given Flexeril to take any also will continue his Percocet at home will refer to orthopedics for follow-up    Pt has been re-examined and states that they are feeling better and have no new complaints. medications, x-rays, diagnosis, follow up plan and return instructions have been reviewed and discussed with the patient and/or family. Pt and/or family were instructed on symptoms that may arise after discharge requiring re-evaluation by a physician. Pt and/or family have had the opportunity to ask questions about their care. Patient and/or family verbalized understanding and agreement with care plan, follow up and return instructions. Patient and/or family agree to return in 50 hours if their symptoms are not improving or immediately if they have any change in their condition.        I have also put together some discharge instructions for patient that include: 1) educational information regarding their diagnosis, 2) how to care for their diagnosis at home, as well a 3) list of reasons why they would want to return to the ED prior to their follow-up appointment, should their condition change. Santana Shepard MD      Procedures      Critical Care Time:   CRITICAL CARE NOTE :    Disposition:  Discharged      DISCHARGE PLAN:  1. Current Discharge Medication List      START taking these medications    Details   predniSONE (DELTASONE) 10 mg tablet Take 30 mg by mouth daily (with breakfast) for 3 days, THEN 20 mg daily (with breakfast) for 3 days, THEN 10 mg daily (with breakfast) for 3 days. Qty: 18 Tablet, Refills: 0  Start date: 2/11/2022, End date: 2/20/2022      cyclobenzaprine (FLEXERIL) 10 mg tablet Take 1 Tablet by mouth three (3) times daily as needed for Muscle Spasm(s). Qty: 20 Tablet, Refills: 0  Start date: 2/11/2022           2. Follow-up Information     Follow up With Specialties Details Why Contact Info    Ashley Musa MD Orthopedic Surgery Schedule an appointment as soon as possible for a visit in 2 days For follow up [address] [facility]  [phone]      your doctor at [facility]  Schedule an appointment as soon as possible for a visit  For follow up         3. Return to ED if worse     Diagnosis     Clinical Impression:   1. Radicular pain in right arm    2. Acute pain of right shoulder        Attestations: Santana Shepard MD    Please note that this dictation was completed with [employer], the computer voice recognition software. Quite often unanticipated grammatical, syntax, homophones, and other interpretive errors are inadvertently transcribed by the computer software. Please disregard these errors. Please excuse any errors that have escaped final proofreading. Thank you.

## 2022-03-19 PROBLEM — I16.0 HYPERTENSIVE URGENCY: Status: ACTIVE | Noted: 2021-06-17

## 2022-03-20 PROBLEM — M54.9 BACK PAIN: Status: ACTIVE | Noted: 2021-06-18

## 2022-04-29 ENCOUNTER — HOSPITAL ENCOUNTER (EMERGENCY)
Age: 57
Discharge: HOME | End: 2022-04-29
Attending: FAMILY MEDICINE

## 2022-04-29 VITALS
HEIGHT: 71 IN | HEART RATE: 90 BPM | WEIGHT: 300 LBS | TEMPERATURE: 99.3 F | SYSTOLIC BLOOD PRESSURE: 169 MMHG | RESPIRATION RATE: 16 BRPM | OXYGEN SATURATION: 99 % | BODY MASS INDEX: 42 KG/M2 | DIASTOLIC BLOOD PRESSURE: 105 MMHG

## 2022-04-29 DIAGNOSIS — S01.81XA CHIN LACERATION, INITIAL ENCOUNTER: Primary | ICD-10-CM

## 2022-04-29 PROCEDURE — 77030010507 HC ADH SKN DERMBND J&J -B

## 2022-04-29 PROCEDURE — 75810000293 HC SIMP/SUPERF WND  RPR

## 2022-04-29 PROCEDURE — 90715 TDAP VACCINE 7 YRS/> IM: CPT | Performed by: FAMILY MEDICINE

## 2022-04-29 PROCEDURE — 74011250636 HC RX REV CODE- 250/636: Performed by: FAMILY MEDICINE

## 2022-04-29 PROCEDURE — 74011250637 HC RX REV CODE- 250/637: Performed by: FAMILY MEDICINE

## 2022-04-29 PROCEDURE — 90471 IMMUNIZATION ADMIN: CPT

## 2022-04-29 PROCEDURE — 99284 EMERGENCY DEPT VISIT MOD MDM: CPT

## 2022-04-29 RX ORDER — INSULIN GLARGINE 100 [IU]/ML
34 INJECTION, SOLUTION SUBCUTANEOUS
COMMUNITY

## 2022-04-29 RX ORDER — CEPHALEXIN 250 MG/1
500 CAPSULE ORAL
Status: COMPLETED | OUTPATIENT
Start: 2022-04-29 | End: 2022-04-29

## 2022-04-29 RX ORDER — INSULIN GLARGINE 100 [IU]/ML
60 INJECTION, SOLUTION SUBCUTANEOUS DAILY
COMMUNITY

## 2022-04-29 RX ORDER — CEPHALEXIN 500 MG/1
500 CAPSULE ORAL 3 TIMES DAILY
Qty: 9 CAPSULE | Refills: 0 | Status: SHIPPED | OUTPATIENT
Start: 2022-04-29 | End: 2022-05-02

## 2022-04-29 RX ADMIN — CEPHALEXIN 500 MG: 250 CAPSULE ORAL at 01:14

## 2022-04-29 RX ADMIN — TETANUS TOXOID, REDUCED DIPHTHERIA TOXOID AND ACELLULAR PERTUSSIS VACCINE, ADSORBED 0.5 ML: 5; 2.5; 8; 8; 2.5 SUSPENSION INTRAMUSCULAR at 01:05

## 2022-04-29 NOTE — DISCHARGE INSTRUCTIONS
--Return to the ED if you have swelling, redness, or pain in the area, or fever/increased glucose levels over the next few days. --The adhesive should wear away after about a week.

## 2022-04-29 NOTE — ED TRIAGE NOTES
Pt was working on car attempting to cut hose when boxcutter slipped and 1-1.5 cm lac on underside of chin-bleeding controlled with pressure

## 2022-04-29 NOTE — ED PROVIDER NOTES
EMERGENCY DEPARTMENT HISTORY AND PHYSICAL EXAM          Date: 4/29/2022  Patient Name: Carola Anna    History of Presenting Illness     Chief Complaint   Patient presents with    Laceration       History Provided By: Patient    HPI: Carola Anna is a 55 y.o. male, pmhx listed below, who presents to the ED c/o a laceration to his chin that he sustained about an hour ago. He was under his car working on the engine when he dropped his  onto his chin. It began to lead a fair amount, so he applied direct pressure. He has minimal pain, and he denies any other injuries. PCP: None    Allergies: morphine, shellfish  Social Hx: Denies tobacco, vaping, EtOH, Illicit Drugs; Lives locally c wife. There are no other complaints, changes, or physical findings at this time. Current Outpatient Medications   Medication Sig Dispense Refill    insulin glargine (LANTUS) 100 unit/mL injection 34 Units by SubCUTAneous route Before breakfast, lunch, dinner and at bedtime.  insulin glargine (Lantus Solostar U-100 Insulin) 100 unit/mL (3 mL) inpn 60 Units by SubCUTAneous route daily.  cephALEXin (Keflex) 500 mg capsule Take 1 Capsule by mouth three (3) times daily for 3 days. 9 Capsule 0    cyclobenzaprine (FLEXERIL) 10 mg tablet Take 1 Tablet by mouth three (3) times daily as needed for Muscle Spasm(s). 20 Tablet 0    hydroCHLOROthiazide (HYDRODIURIL) 25 mg tablet Take 2 Tablets by mouth daily. 30 Tablet 1    amLODIPine (NORVASC) 10 mg tablet Take  by mouth daily.  aspirin 81 mg chewable tablet Take 81 mg by mouth daily.  atorvastatin (LIPITOR) 80 mg tablet Take 80 mg by mouth daily.  docusate sodium (COLACE) 100 mg capsule Take 100 mg by mouth two (2) times a day.  lisinopril (PRINIVIL, ZESTRIL) 40 mg tablet Take 40 mg by mouth daily.  metoprolol tartrate (LOPRESSOR) 50 mg tablet Take 50 mg by mouth two (2) times a day.       minocycline (MINOCIN, DYNACIN) 100 mg capsule Take 100 mg by mouth.  omeprazole (PRILOSEC) 20 mg capsule Take 20 mg by mouth daily.  prazosin (MINIPRESS) 2 mg capsule Take 2 mg by mouth daily.  sennosides 8.6 mg cap Take 1 Tab by mouth.  venlafaxine (EFFEXOR) 75 mg tablet Take 75 mg by mouth three (3) times daily. Past History     Past Medical History:  Past Medical History:   Diagnosis Date    Arthritis     Diabetes ([address]     Hyperlipidemia     Hypertension        Past Surgical History:  No past surgical history on file. Family History:  History reviewed. No pertinent family history. Social History:  Social History     Tobacco Use    Smoking status: Never Smoker    Smokeless tobacco: Never Used   Substance Use Topics    Alcohol use: No    Drug use: Never       Allergies: Allergies   Allergen Reactions    Morphine Other (comments)     hallucinations    Shellfish Derived Hives     No reaction to iodine contrast on 6/17/21         Review of Systems   Review of Systems   Constitutional: Negative for chills and fever. Respiratory: Negative for cough and shortness of breath. Skin: Positive for wound. Physical Exam   Physical Exam  Constitutional:       Appearance: Normal appearance. HENT:      Head: Normocephalic. Right Ear: External ear normal.      Left Ear: External ear normal.      Nose: Nose normal.      Mouth/Throat:      Mouth: Mucous membranes are moist.   Eyes:      Pupils: Pupils are equal, round, and reactive to light. Cardiovascular:      Rate and Rhythm: Normal rate and regular rhythm. Pulmonary:      Effort: Pulmonary effort is normal.   Musculoskeletal:         General: Normal range of motion. Skin:     General: Skin is warm and dry. Findings: Laceration present. Comments: --1 cm laceration/flap under chin. Hemostatic, clean. Neurological:      General: No focal deficit present. Mental Status: He is alert and oriented to person, place, and time.          Diagnostic Study Results     Labs -   No results found for this or any previous visit (from the past 12 hour(s)). Radiologic Studies -   No orders to display     CT Results  (Last 48 hours)    None        CXR Results  (Last 48 hours)    None            Medical Decision Making   I am the first provider for this patient. I reviewed the vital signs, available nursing notes, past medical history, past surgical history, family history and social history. Vital Signs-Reviewed the patient's vital signs. Patient Vitals for the past 12 hrs:   Temp Pulse Resp BP SpO2   04/29/22 0112  90 16 (!) 169/105    04/29/22 0049 99.3 °F (37.4 °C) 98 18 (!) 166/96 99 %       Pulse Oximetry Analysis - 99% on RA    Records Reviewed: Nursing Notes and Old Medical Records    Provider Notes (Medical Decision Making):   MDM: Pt with laceration under chin. Will close c tissue adhesive. ED Course:   Initial assessment performed. The patients presenting problems have been discussed, and they are in agreement with the care plan formulated and outlined with them. I have encouraged them to ask questions as they arise throughout their visit. PROGRESS NOTE:  Wound Closure by Adhesive    Date/Time: 4/29/2022 5:17 AM  Performed by: Hafsa Nunez MD  Authorized by: Hafsa Nunez MD     Consent:     Consent obtained:  Verbal    Consent given by:  Patient    Risks discussed:  Infection, pain and poor cosmetic result    Alternatives discussed:  No treatment  Anesthesia (see MAR for exact dosages): Anesthesia method:  None  Laceration details:     Location:  Face    Facial location: under chin.     Length (cm):  1  Repair type:     Repair type:  Simple  Pre-procedure details:     Preparation:  Patient was prepped and draped in usual sterile fashion  Treatment:     Irrigation method:  Syringe    Visualized foreign bodies/material removed: no    Skin repair:     Repair method:  Tissue adhesive  Approximation:     Approximation: Close  Post-procedure details:     Dressing:  Open (no dressing)    Patient tolerance of procedure: Tolerated well, no immediate complications            Discharge note:    Pt re-evaluated and noted to be feeling better, ready for discharge. Updated pt on all final lab  findings. Will follow up as instructed. All questions have been answered, pt voiced understanding and agreement with plan. Abx were prescribed, pt advised that diarrhea and rash are possible side effects of the medications. Specific return precautions provided as well as instructions to return to the ED should sx worsen at any time. Vital signs stable for discharge. Critical Care Time:   0      Diagnosis     Clinical Impression:   1. Chin laceration, initial encounter        PLAN:  1. Discharge Medication List as of 4/29/2022  1:09 AM      CONTINUE these medications which have NOT CHANGED    Details   insulin glargine (LANTUS) 100 unit/mL injection 34 Units by SubCUTAneous route Before breakfast, lunch, dinner and at bedtime. , Historical Med      insulin glargine (Lantus Solostar U-100 Insulin) 100 unit/mL (3 mL) inpn 60 Units by SubCUTAneous route daily. , Historical Med      cyclobenzaprine (FLEXERIL) 10 mg tablet Take 1 Tablet by mouth three (3) times daily as needed for Muscle Spasm(s). , Normal, Disp-20 Tablet, R-0      hydroCHLOROthiazide (HYDRODIURIL) 25 mg tablet Take 2 Tablets by mouth daily. , Normal, Disp-30 Tablet, R-1      amLODIPine (NORVASC) 10 mg tablet Take  by mouth daily. , Historical Med      aspirin 81 mg chewable tablet Take 81 mg by mouth daily. , Historical Med      atorvastatin (LIPITOR) 80 mg tablet Take 80 mg by mouth daily. , Historical Med      docusate sodium (COLACE) 100 mg capsule Take 100 mg by mouth two (2) times a day., Historical Med      lisinopril (PRINIVIL, ZESTRIL) 40 mg tablet Take 40 mg by mouth daily. , Historical Med      metoprolol tartrate (LOPRESSOR) 50 mg tablet Take 50 mg by mouth two (2) times a day., Historical Med      minocycline (MINOCIN, DYNACIN) 100 mg capsule Take 100 mg by mouth., Historical Med      omeprazole (PRILOSEC) 20 mg capsule Take 20 mg by mouth daily. , Historical Med      prazosin (MINIPRESS) 2 mg capsule Take 2 mg by mouth daily. , Historical Med      sennosides 8.6 mg cap Take 1 Tab by mouth., Historical Med      venlafaxine (EFFEXOR) 75 mg tablet Take 75 mg by mouth three (3) times daily. , Historical Med           2.    Follow-up Information    None       Return to ED if worse     Disposition:  Home

## 2022-05-11 ENCOUNTER — HOSPITAL ENCOUNTER (EMERGENCY)
Age: 57
Discharge: HOME | End: 2022-05-11
Attending: EMERGENCY MEDICINE
Payer: OTHER GOVERNMENT

## 2022-05-11 VITALS
TEMPERATURE: 98.2 F | OXYGEN SATURATION: 98 % | SYSTOLIC BLOOD PRESSURE: 182 MMHG | WEIGHT: 290 LBS | HEIGHT: 71 IN | HEART RATE: 87 BPM | DIASTOLIC BLOOD PRESSURE: 94 MMHG | BODY MASS INDEX: 40.6 KG/M2 | RESPIRATION RATE: 16 BRPM

## 2022-05-11 DIAGNOSIS — Z51.89 ENCOUNTER FOR WOUND RE-CHECK: Primary | ICD-10-CM

## 2022-05-11 PROCEDURE — 99283 EMERGENCY DEPT VISIT LOW MDM: CPT

## 2022-05-11 PROCEDURE — 74011250637 HC RX REV CODE- 250/637: Performed by: EMERGENCY MEDICINE

## 2022-05-11 RX ORDER — CEPHALEXIN 500 MG/1
500 CAPSULE ORAL 3 TIMES DAILY
Qty: 15 CAPSULE | Refills: 0 | Status: SHIPPED | OUTPATIENT
Start: 2022-05-11 | End: 2022-05-16

## 2022-05-11 RX ORDER — CEPHALEXIN 250 MG/1
500 CAPSULE ORAL
Status: COMPLETED | OUTPATIENT
Start: 2022-05-11 | End: 2022-05-11

## 2022-05-11 RX ADMIN — CEPHALEXIN 500 MG: 250 CAPSULE ORAL at 01:24

## 2022-05-11 NOTE — ED TRIAGE NOTES
Pt arrived POV with c/o wound to chin that reopened after being dermabonded recently. Pt states he was concerned due to being a diabetic and wanted to get it checked. No active bleeding at this time.

## 2022-05-11 NOTE — ED PROVIDER NOTES
EMERGENCY DEPARTMENT HISTORY AND PHYSICAL EXAM      Date: 5/11/2022  Patient Name: Marcelina Peñaloza      Diagnosis     Clinical Impression:   1. Encounter for wound re-check              History of Presenting Illness     Chief Complaint   Patient presents with    Wound Check       History Provided By: Patient    HPI:   The history is provided by the patient. Wound Check   This is a new problem. The current episode started more than 1 week ago. The problem has not changed since onset. The patient is experiencing no pain. Pertinent negatives include no back pain. He has tried nothing for the symptoms. Marcelina Peñaloza, 55 y.o. male  presents to the ED with cc of wound check. Patient reports the Dermabond that was placed on his wound off in the shower earlier Tuesday morning, he was concerned because he is diabetic about infection and came in for reevaluation. Reports a slight bleeding but denies any discharge, denies any new trauma to the wound. Denies any facial swelling. There are no other complaints, changes, or physical findings at this time. PCP: None    No current facility-administered medications on file prior to encounter. Current Outpatient Medications on File Prior to Encounter   Medication Sig Dispense Refill    insulin glargine (LANTUS) 100 unit/mL injection 34 Units by SubCUTAneous route Before breakfast, lunch, dinner and at bedtime.  insulin glargine (Lantus Solostar U-100 Insulin) 100 unit/mL (3 mL) inpn 60 Units by SubCUTAneous route daily.  cyclobenzaprine (FLEXERIL) 10 mg tablet Take 1 Tablet by mouth three (3) times daily as needed for Muscle Spasm(s). 20 Tablet 0    hydroCHLOROthiazide (HYDRODIURIL) 25 mg tablet Take 2 Tablets by mouth daily. 30 Tablet 1    amLODIPine (NORVASC) 10 mg tablet Take  by mouth daily.  aspirin 81 mg chewable tablet Take 81 mg by mouth daily.  atorvastatin (LIPITOR) 80 mg tablet Take 80 mg by mouth daily.       docusate sodium (COLACE) 100 mg capsule Take 100 mg by mouth two (2) times a day.  lisinopril (PRINIVIL, ZESTRIL) 40 mg tablet Take 40 mg by mouth daily.  metoprolol tartrate (LOPRESSOR) 50 mg tablet Take 50 mg by mouth two (2) times a day.  minocycline (MINOCIN, DYNACIN) 100 mg capsule Take 100 mg by mouth.  omeprazole (PRILOSEC) 20 mg capsule Take 20 mg by mouth daily.  prazosin (MINIPRESS) 2 mg capsule Take 2 mg by mouth daily.  sennosides 8.6 mg cap Take 1 Tab by mouth.  venlafaxine (EFFEXOR) 75 mg tablet Take 75 mg by mouth three (3) times daily. Past History     Past Medical History:  Past Medical History:   Diagnosis Date    Arthritis     Diabetes ([facility] [address]     Hyperlipidemia     Hypertension        Past Surgical History:  No past surgical history on file. Family History:  No family history on file. Social History:  Social History     Tobacco Use    Smoking status: Never Smoker    Smokeless tobacco: Never Used   Substance Use Topics    Alcohol use: No    Drug use: Never       Allergies: Allergies   Allergen Reactions    Morphine Other (comments)     hallucinations    Shellfish Derived Hives     No reaction to iodine contrast on 6/17/21         Review of Systems   Review of Systems   Constitutional: Negative. Negative for chills and fever. HENT: Negative. Negative for congestion and sore throat. Respiratory: Negative. Cardiovascular: Negative. Gastrointestinal: Negative. Negative for nausea and vomiting. Musculoskeletal: Negative. Negative for arthralgias and back pain. Skin: Positive for wound. Negative for rash. Allergic/Immunologic: Negative. Neurological: Negative. Negative for dizziness and headaches. Hematological: Negative. Negative for adenopathy. Does not bruise/bleed easily. Psychiatric/Behavioral: Negative. Negative for confusion. The patient is not nervous/anxious. All other systems reviewed and are negative.       Physical Exam Physical Exam  Vitals and nursing note reviewed. Constitutional:       General: He is not in acute distress. Appearance: Normal appearance. He is well-developed. He is not ill-appearing, toxic-appearing or diaphoretic. HENT:      Head: Normocephalic. Nose: Nose normal.      Mouth/Throat:      Mouth: Mucous membranes are moist.      Pharynx: Oropharynx is clear. Eyes:      Extraocular Movements: Extraocular movements intact. Conjunctiva/sclera: Conjunctivae normal.      Pupils: Pupils are equal, round, and reactive to light. Neck:      Trachea: Trachea normal.     Cardiovascular:      Rate and Rhythm: Normal rate and regular rhythm. Pulses: Normal pulses. Pulmonary:      Effort: Pulmonary effort is normal. No respiratory distress. Abdominal:      General: There is no distension. Palpations: Abdomen is soft. Musculoskeletal:         General: No swelling or tenderness. Normal range of motion. Cervical back: Normal range of motion and neck supple. No signs of trauma, rigidity or tenderness. No muscular tenderness. Normal range of motion. Skin:     General: Skin is warm and dry. Capillary Refill: Capillary refill takes less than 2 seconds. Findings: Laceration present. No abscess, erythema or rash. Neurological:      General: No focal deficit present. Mental Status: He is alert and oriented to person, place, and time. Mental status is at baseline. Cranial Nerves: No cranial nerve deficit. Sensory: No sensory deficit. Psychiatric:         Mood and Affect: Mood normal.         Behavior: Behavior normal.         Thought Content: Thought content normal.         Judgment: Judgment normal.         Diagnostic Study Results     Labs -   No results found for this or any previous visit (from the past 12 hour(s)).     Radiologic Studies -   No orders to display     CT Results  (Last 48 hours)    None        CXR Results  (Last 48 hours)    None Medical Decision Making   I am the first provider for this patient. I reviewed the vital signs, available nursing notes, past medical history, past surgical history, family history and social history. Vital Signs-Reviewed the patient's vital signs. Patient Vitals for the past 12 hrs:   Pulse Resp BP SpO2   05/11/22 0033 87 18 (!) 189/107 97 %             Records Reviewed: Nursing Notes and Old Medical Records    Provider Notes (Medical Decision Making):   Presents for wound check after Dermabond came off the wound. ED Course:   Initial assessment performed. The patients presenting problems have been discussed, and they are in agreement with the care plan formulated and outlined with them. I have encouraged them to ask questions as they arise throughout their visit. Medications Given in the ED:    Medications   cephALEXin (KEFLEX) capsule 500 mg (has no administration in time range)           1:18 AM    Reviewed wound care management of the wound home plan for antibiotics and need for follow-up symptoms watch for questions answered. Pt has been re-examined and states that they are feeling better and have no new complaints. medications,  diagnosis, follow up plan and return instructions have been reviewed and discussed with the patient and/or family. Pt and/or family were instructed on symptoms that may arise after discharge requiring re-evaluation by a physician. Pt and/or family have had the opportunity to ask questions about their care. Patient and/or family verbalized understanding and agreement with care plan, follow up and return instructions. Patient and/or family agree to return in 50 hours if their symptoms are not improving or immediately if they have any change in their condition. Abx were prescribed, pt advised that diarrhea and rash are possible side effects of the medications.      I have also put together some discharge instructions for patient that include: 1) educational information regarding their diagnosis, 2) how to care for their diagnosis at home, as well a 3) list of reasons why they would want to return to the ED prior to their follow-up appointment, should their condition change. Gen Adorno MD      Procedures        Disposition:  Discharged      DISCHARGE PLAN:  1. Current Discharge Medication List      START taking these medications    Details   cephALEXin (Keflex) 500 mg capsule Take 1 Capsule by mouth three (3) times daily for 5 days. Qty: 15 Capsule, Refills: 0  Start date: 5/11/2022, End date: 5/16/2022           2. Follow-up Information     Follow up With Specialties Details Why Contact Info    [facility] Go to  If symptoms worsen [facility]  [address] [address]  [phone]        3. Return to ED if worse       Attestations: Gen Adorno MD    Please note that this dictation was completed with [employer], the computer voice recognition software. Quite often unanticipated grammatical, syntax, homophones, and other interpretive errors are inadvertently transcribed by the computer software. Please disregard these errors. Please excuse any errors that have escaped final proofreading. Thank you.

## 2023-05-23 ENCOUNTER — HOSPITAL ENCOUNTER (EMERGENCY)
Facility: HOSPITAL | Age: 58
Discharge: HOME | End: 2023-05-23
Attending: FAMILY MEDICINE | Admitting: FAMILY MEDICINE
Payer: OTHER GOVERNMENT

## 2023-05-23 VITALS
HEIGHT: 71 IN | DIASTOLIC BLOOD PRESSURE: 82 MMHG | RESPIRATION RATE: 20 BRPM | HEART RATE: 88 BPM | TEMPERATURE: 99.5 F | OXYGEN SATURATION: 97 % | WEIGHT: 270 LBS | SYSTOLIC BLOOD PRESSURE: 147 MMHG | BODY MASS INDEX: 37.8 KG/M2

## 2023-05-23 DIAGNOSIS — H66.001 NON-RECURRENT ACUTE SUPPURATIVE OTITIS MEDIA OF RIGHT EAR WITHOUT SPONTANEOUS RUPTURE OF TYMPANIC MEMBRANE: Primary | ICD-10-CM

## 2023-05-23 PROCEDURE — 6370000000 HC RX 637 (ALT 250 FOR IP): Performed by: FAMILY MEDICINE

## 2023-05-23 PROCEDURE — 99283 EMERGENCY DEPT VISIT LOW MDM: CPT

## 2023-05-23 RX ORDER — AMOXICILLIN 250 MG/1
500 CAPSULE ORAL EVERY 8 HOURS SCHEDULED
Status: DISCONTINUED | OUTPATIENT
Start: 2023-05-23 | End: 2023-05-23 | Stop reason: HOSPADM

## 2023-05-23 RX ORDER — AMOXICILLIN 500 MG/1
500 CAPSULE ORAL 3 TIMES DAILY
Qty: 21 CAPSULE | Refills: 0 | Status: SHIPPED | OUTPATIENT
Start: 2023-05-23 | End: 2023-05-30

## 2023-05-23 RX ORDER — AMOXICILLIN 250 MG/1
500 CAPSULE ORAL EVERY 8 HOURS SCHEDULED
Status: DISCONTINUED | OUTPATIENT
Start: 2023-05-23 | End: 2023-05-23

## 2023-05-23 RX ADMIN — AMOXICILLIN 500 MG: 250 CAPSULE ORAL at 19:54

## 2023-05-23 ASSESSMENT — LIFESTYLE VARIABLES
HOW OFTEN DO YOU HAVE A DRINK CONTAINING ALCOHOL: NEVER
HOW MANY STANDARD DRINKS CONTAINING ALCOHOL DO YOU HAVE ON A TYPICAL DAY: PATIENT DOES NOT DRINK

## 2023-05-23 ASSESSMENT — PAIN DESCRIPTION - PAIN TYPE: TYPE: ACUTE PAIN

## 2023-05-23 ASSESSMENT — PAIN DESCRIPTION - DESCRIPTORS: DESCRIPTORS: ACHING

## 2023-05-23 ASSESSMENT — PAIN DESCRIPTION - LOCATION: LOCATION: EAR

## 2023-05-23 ASSESSMENT — PAIN - FUNCTIONAL ASSESSMENT
PAIN_FUNCTIONAL_ASSESSMENT: 0-10
PAIN_FUNCTIONAL_ASSESSMENT: NONE - DENIES PAIN

## 2023-05-23 ASSESSMENT — PAIN DESCRIPTION - ORIENTATION: ORIENTATION: RIGHT

## 2023-05-23 ASSESSMENT — PAIN DESCRIPTION - DIRECTION: RADIATING_TOWARDS: JAW

## 2023-05-23 ASSESSMENT — PAIN SCALES - GENERAL: PAINLEVEL_OUTOF10: 6

## 2023-05-23 ASSESSMENT — PAIN DESCRIPTION - FREQUENCY: FREQUENCY: CONTINUOUS

## 2023-05-23 NOTE — ED NOTES
I have reviewed discharge instructions with the patient. The patient verbalized understanding. Discharge medications discussed with patient. No questions at this time. Ambulated without difficulty.        Chay Rao, JULES  05/23/23 0084

## 2023-05-23 NOTE — ED TRIAGE NOTES
Pt arrived by POV for ear ache. Pt reports for 3 days he has had a right ear pain with radiation to the right side of his face, pt also complains of a sore throat. Pt speaking in full complete sentences  NAD. Pt is awake alert and oriented X 4, pt educated on ER flow.

## 2023-05-23 NOTE — DISCHARGE INSTRUCTIONS
--Amoxicillin 500 mg 3 times daily for the next  7 days. --Tylenol 1000 mg every 6 hours as needed for pain. --Follow up with your doctor if no better in 2 days.

## 2023-05-23 NOTE — ED NOTES
[facility] EMERGENCY DEP  EMERGENCY DEPARTMENT ENCOUNTER       Pt Name: Margy Montenegro  MRN: [id number]  [place] 1965  Date of evaluation: 5/23/2023  Provider: Darleen King MD   PCP: None None  Note Started: 7:22 PM 5/23/23     CHIEF COMPLAINT       Chief Complaint   Patient presents with    Otalgia     right        HISTORY OF PRESENT ILLNESS: 1 or more elements      History From: Patient  HPI Limitations: None     Margy Montenegro is a 65 y.o. male who presents to the ED because of an ear ache that he has had for the last 3 days. He has not had an ear infection for quite a long time. The pain has spread to his whole right side of his face, and now he has a sore throat. No pain in his left ear, no cough or rhinorrhea. Does not think he has had a fever. Usually gets seasonal allergies in August.     Nursing Notes were all reviewed and agreed with or any disagreements were addressed in the HPI. REVIEW OF SYSTEMS      Review of Systems     Positives and Pertinent negatives as per HPI. PAST HISTORY     Past Medical History:  Past Medical History:   Diagnosis Date    Arthritis     Diabetes ([address]     Hyperlipidemia     Hypertension          Past Surgical History:  History reviewed. No pertinent surgical history. Family History:  History reviewed. No pertinent family history. Social History:  Social History     Tobacco Use    Smoking status: Never    Smokeless tobacco: Never   Substance Use Topics    Alcohol use: No    Drug use: Never       Allergies:   Allergies   Allergen Reactions    Morphine Other (See Comments)     hallucinations    Shellfish Allergy Hives     No reaction to iodine contrast on 6/17/21       CURRENT MEDICATIONS      Previous Medications    AMLODIPINE (NORVASC) 10 MG TABLET    Take by mouth daily    ASPIRIN 81 MG CHEWABLE TABLET    Take 81 mg by mouth daily    ATORVASTATIN (LIPITOR) 80 MG TABLET    Take 80 mg by mouth daily    CYCLOBENZAPRINE (FLEXERIL) 10 MG TABLET    Take 10 mg by mouth 3

## 2024-08-13 ENCOUNTER — HOSPITAL ENCOUNTER (EMERGENCY)
Facility: HOSPITAL | Age: 59
Discharge: HOME | End: 2024-08-13
Payer: OTHER GOVERNMENT

## 2024-08-13 VITALS
RESPIRATION RATE: 18 BRPM | OXYGEN SATURATION: 98 % | HEART RATE: 82 BPM | DIASTOLIC BLOOD PRESSURE: 109 MMHG | TEMPERATURE: 98.7 F | WEIGHT: 270 LBS | HEIGHT: 71 IN | SYSTOLIC BLOOD PRESSURE: 173 MMHG | BODY MASS INDEX: 37.8 KG/M2

## 2024-08-13 DIAGNOSIS — M10.9 ACUTE GOUT OF RIGHT WRIST, UNSPECIFIED CAUSE: Primary | ICD-10-CM

## 2024-08-13 PROCEDURE — 99283 EMERGENCY DEPT VISIT LOW MDM: CPT

## 2024-08-13 RX ORDER — OXYCODONE HYDROCHLORIDE 5 MG/1
5 TABLET ORAL EVERY 6 HOURS PRN
Qty: 10 TABLET | Refills: 0 | Status: SHIPPED | OUTPATIENT
Start: 2024-08-13 | End: 2024-08-16

## 2024-08-13 RX ORDER — COLCHICINE 0.6 MG/1
0.6 TABLET ORAL DAILY
Qty: 7 TABLET | Refills: 3 | Status: SHIPPED | OUTPATIENT
Start: 2024-08-13

## 2024-08-13 RX ORDER — INDOMETHACIN 50 MG/1
50 CAPSULE ORAL 2 TIMES DAILY WITH MEALS
Qty: 30 CAPSULE | Refills: 0 | Status: SHIPPED | OUTPATIENT
Start: 2024-08-13

## 2024-08-13 RX ORDER — PREDNISONE 10 MG/1
1 TABLET ORAL ONCE
Qty: 1 EACH | Refills: 0 | Status: SHIPPED | OUTPATIENT
Start: 2024-08-13 | End: 2024-08-13

## 2024-08-13 ASSESSMENT — PAIN DESCRIPTION - DESCRIPTORS: DESCRIPTORS: ACHING

## 2024-08-13 ASSESSMENT — LIFESTYLE VARIABLES: HOW OFTEN DO YOU HAVE A DRINK CONTAINING ALCOHOL: NEVER

## 2024-08-13 ASSESSMENT — PAIN DESCRIPTION - LOCATION: LOCATION: HAND

## 2024-08-13 ASSESSMENT — PAIN SCALES - GENERAL
PAINLEVEL_OUTOF10: 10
PAINLEVEL_OUTOF10: 10

## 2024-08-13 ASSESSMENT — PAIN - FUNCTIONAL ASSESSMENT: PAIN_FUNCTIONAL_ASSESSMENT: 0-10

## 2024-08-13 NOTE — ED PROVIDER NOTES
EMERGENCY DEPARTMENT HISTORY AND PHYSICAL EXAM      Date: (Not on file)  Patient Name: Mony Zhu    History of Presenting Illness     Chief Complaint   Patient presents with    Gout       History Provided By: Patient    HPI: Mony Zhu, 58 y.o. male with PMHx as noted below since the emergency room with complaints of right wrist pain.  Patient states consistent with her episodes of gout and has had multiple episodes of gout in the wrist.  No fevers or systemic symptoms.    Pt denies any other alleviating or exacerbating factors. Additionally, pt specifically denies any recent fever, chills, headache, nausea, vomiting, abdominal pain, CP, SOB, lightheadedness, dizziness, numbness, weakness, BLE swelling, heart palpitations, urinary sxs, diarrhea, constipation, melena, hematochezia, cough, or congestion.    PCP: None, None    No current facility-administered medications for this encounter.     Current Outpatient Medications   Medication Sig Dispense Refill    indomethacin (INDOCIN) 50 MG capsule Take 1 capsule by mouth 2 times daily (with meals) 30 capsule 0    predniSONE 10 MG (21) TBPK Take 1 dose pack by mouth once for 1 dose 1 each 0    amLODIPine (NORVASC) 10 MG tablet Take by mouth daily      aspirin 81 MG chewable tablet Take 81 mg by mouth daily      atorvastatin (LIPITOR) 80 MG tablet Take 80 mg by mouth daily      cyclobenzaprine (FLEXERIL) 10 MG tablet Take 10 mg by mouth 3 times daily as needed      docusate (COLACE, DULCOLAX) 100 MG CAPS Take 100 mg by mouth 2 times daily      hydroCHLOROthiazide (HYDRODIURIL) 25 MG tablet Take 50 mg by mouth daily      insulin glargine (LANTUS SOLOSTAR) 100 UNIT/ML injection pen Inject 60 Units into the skin daily      insulin glargine (LANTUS) 100 UNIT/ML injection vial Inject 34 Units into the skin 4 times daily (before meals and nightly)      lisinopril (PRINIVIL;ZESTRIL) 40 MG tablet Take 40 mg by mouth daily      metoprolol tartrate (LOPRESSOR) 50 MG tablet

## 2024-08-13 NOTE — ED TRIAGE NOTES
Patient presents to the Ed with a complaint of right hand swollen.  Complains of right hand swelling.  Has hx of gout.  PMD took him off his medication for gout.

## 2025-08-23 ENCOUNTER — APPOINTMENT (OUTPATIENT)
Facility: HOSPITAL | Age: 60
End: 2025-08-23
Payer: OTHER GOVERNMENT

## 2025-08-23 ENCOUNTER — HOSPITAL ENCOUNTER (EMERGENCY)
Facility: HOSPITAL | Age: 60
Discharge: HOME | End: 2025-08-23
Attending: EMERGENCY MEDICINE
Payer: OTHER GOVERNMENT

## 2025-08-23 VITALS
WEIGHT: 275 LBS | HEART RATE: 99 BPM | RESPIRATION RATE: 16 BRPM | SYSTOLIC BLOOD PRESSURE: 164 MMHG | HEIGHT: 71 IN | DIASTOLIC BLOOD PRESSURE: 74 MMHG | OXYGEN SATURATION: 96 % | TEMPERATURE: 98.1 F | BODY MASS INDEX: 38.5 KG/M2
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
## 2025-08-23 DIAGNOSIS — S52.501A CLOSED FRACTURE OF DISTAL END OF RIGHT RADIUS, UNSPECIFIED FRACTURE MORPHOLOGY, INITIAL ENCOUNTER: Primary | ICD-10-CM

## 2025-08-23 DIAGNOSIS — S93.402A SPRAIN OF LEFT ANKLE, UNSPECIFIED LIGAMENT, INITIAL ENCOUNTER: ICD-10-CM

## 2025-08-23 PROCEDURE — 73610 X-RAY EXAM OF ANKLE: CPT

## 2025-08-23 PROCEDURE — 99283 EMERGENCY DEPT VISIT LOW MDM: CPT

## 2025-08-23 PROCEDURE — 29125 APPL SHORT ARM SPLINT STATIC: CPT

## 2025-08-23 PROCEDURE — 73110 X-RAY EXAM OF WRIST: CPT

## 2025-08-23 PROCEDURE — 6370000000 HC RX 637 (ALT 250 FOR IP): Performed by: EMERGENCY MEDICINE

## 2025-08-23 RX ORDER — OXYCODONE HYDROCHLORIDE 5 MG/1
5 TABLET ORAL EVERY 6 HOURS PRN
Qty: 12 TABLET | Refills: 0 | Status: SHIPPED | OUTPATIENT
Start: 2025-08-23 | End: 2025-08-26

## 2025-08-23 RX ORDER — OXYCODONE AND ACETAMINOPHEN 5; 325 MG/1; MG/1
1 TABLET ORAL
Refills: 0 | Status: COMPLETED | OUTPATIENT
Start: 2025-08-23 | End: 2025-08-23

## 2025-08-23 RX ADMIN — OXYCODONE HYDROCHLORIDE AND ACETAMINOPHEN 1 TABLET: 5; 325 TABLET ORAL at 03:42

## 2025-08-23 ASSESSMENT — ENCOUNTER SYMPTOMS
SHORTNESS OF BREATH: 0
BACK PAIN: 1

## 2025-08-23 ASSESSMENT — PAIN - FUNCTIONAL ASSESSMENT: PAIN_FUNCTIONAL_ASSESSMENT: 0-10

## 2025-08-23 ASSESSMENT — PAIN SCALES - GENERAL
PAINLEVEL_OUTOF10: 10
PAINLEVEL_OUTOF10: 10
PAINLEVEL_OUTOF10: 8

## 2025-08-23 ASSESSMENT — PAIN DESCRIPTION - LOCATION: LOCATION: ANKLE;WRIST

## 2025-08-26 ENCOUNTER — HOSPITAL ENCOUNTER (EMERGENCY)
Facility: HOSPITAL | Age: 60
Discharge: HOME | End: 2025-08-26
Attending: EMERGENCY MEDICINE
Payer: OTHER GOVERNMENT

## 2025-08-26 VITALS
SYSTOLIC BLOOD PRESSURE: 159 MMHG | BODY MASS INDEX: 38.5 KG/M2 | RESPIRATION RATE: 16 BRPM | TEMPERATURE: 99.1 F | WEIGHT: 275 LBS | HEART RATE: 80 BPM | DIASTOLIC BLOOD PRESSURE: 95 MMHG | OXYGEN SATURATION: 98 % | HEIGHT: 71 IN

## 2025-08-26 DIAGNOSIS — Z47.89 AFTERCARE FOR CAST OR SPLINT CHECK OR CHANGE: ICD-10-CM

## 2025-08-26 DIAGNOSIS — S52.509D CLOSED FRACTURE OF DISTAL END OF RADIUS WITH ROUTINE HEALING, UNSPECIFIED FRACTURE MORPHOLOGY, UNSPECIFIED LATERALITY, SUBSEQUENT ENCOUNTER: Primary | ICD-10-CM

## 2025-08-26 PROCEDURE — 99282 EMERGENCY DEPT VISIT SF MDM: CPT

## 2025-08-26 ASSESSMENT — PAIN - FUNCTIONAL ASSESSMENT
PAIN_FUNCTIONAL_ASSESSMENT: 0-10
PAIN_FUNCTIONAL_ASSESSMENT: 0-10

## 2025-08-26 ASSESSMENT — PAIN DESCRIPTION - DESCRIPTORS: DESCRIPTORS: TIGHTNESS

## 2025-08-26 ASSESSMENT — PAIN DESCRIPTION - LOCATION: LOCATION: ARM

## 2025-08-26 ASSESSMENT — PAIN SCALES - GENERAL
PAINLEVEL_OUTOF10: 7
PAINLEVEL_OUTOF10: 10

## 2025-08-26 ASSESSMENT — PAIN DESCRIPTION - ORIENTATION: ORIENTATION: RIGHT

## 2025-08-26 ASSESSMENT — LIFESTYLE VARIABLES
HOW MANY STANDARD DRINKS CONTAINING ALCOHOL DO YOU HAVE ON A TYPICAL DAY: PATIENT DOES NOT DRINK
HOW OFTEN DO YOU HAVE A DRINK CONTAINING ALCOHOL: NEVER